# Patient Record
Sex: FEMALE | Race: ASIAN | NOT HISPANIC OR LATINO | Employment: OTHER | URBAN - METROPOLITAN AREA
[De-identification: names, ages, dates, MRNs, and addresses within clinical notes are randomized per-mention and may not be internally consistent; named-entity substitution may affect disease eponyms.]

---

## 2023-06-12 ENCOUNTER — VIRTUAL VISIT (OUTPATIENT)
Dept: FAMILY MEDICINE | Facility: CLINIC | Age: 78
End: 2023-06-12
Payer: COMMERCIAL

## 2023-06-12 DIAGNOSIS — Z91.199 NO-SHOW FOR APPOINTMENT: Primary | ICD-10-CM

## 2023-06-19 ENCOUNTER — TELEPHONE (OUTPATIENT)
Dept: FAMILY MEDICINE | Facility: CLINIC | Age: 78
End: 2023-06-19
Payer: COMMERCIAL

## 2023-06-19 NOTE — TELEPHONE ENCOUNTER
Left message to call     Patient needs to schedule appt with Dr Fonseca for med check. Telephone or in person is fine   Ok to use video appt slots for either type of appt per Dr Fonseca

## 2023-06-20 ENCOUNTER — APPOINTMENT (OUTPATIENT)
Dept: INTERPRETER SERVICES | Facility: CLINIC | Age: 78
End: 2023-06-20
Payer: COMMERCIAL

## 2023-06-23 ENCOUNTER — OFFICE VISIT (OUTPATIENT)
Dept: FAMILY MEDICINE | Facility: CLINIC | Age: 78
End: 2023-06-23
Payer: COMMERCIAL

## 2023-06-23 VITALS
BODY MASS INDEX: 21.57 KG/M2 | RESPIRATION RATE: 16 BRPM | HEIGHT: 57 IN | OXYGEN SATURATION: 95 % | WEIGHT: 100 LBS | TEMPERATURE: 97.9 F | DIASTOLIC BLOOD PRESSURE: 56 MMHG | HEART RATE: 55 BPM | SYSTOLIC BLOOD PRESSURE: 152 MMHG

## 2023-06-23 DIAGNOSIS — E78.2 MIXED HYPERLIPIDEMIA: ICD-10-CM

## 2023-06-23 DIAGNOSIS — R21 RASH: ICD-10-CM

## 2023-06-23 DIAGNOSIS — Z11.59 NEED FOR HEPATITIS C SCREENING TEST: ICD-10-CM

## 2023-06-23 DIAGNOSIS — L29.9 ITCHING: ICD-10-CM

## 2023-06-23 DIAGNOSIS — I10 HYPERTENSION, UNSPECIFIED TYPE: Primary | ICD-10-CM

## 2023-06-23 LAB
ALBUMIN SERPL BCG-MCNC: 4.1 G/DL (ref 3.5–5.2)
ALP SERPL-CCNC: 86 U/L (ref 35–104)
ALT SERPL W P-5'-P-CCNC: 29 U/L (ref 0–50)
ANION GAP SERPL CALCULATED.3IONS-SCNC: 9 MMOL/L (ref 7–15)
AST SERPL W P-5'-P-CCNC: 31 U/L (ref 0–45)
BASOPHILS # BLD AUTO: 0 10E3/UL (ref 0–0.2)
BASOPHILS NFR BLD AUTO: 1 %
BILIRUB DIRECT SERPL-MCNC: <0.2 MG/DL (ref 0–0.3)
BILIRUB SERPL-MCNC: 0.6 MG/DL
BUN SERPL-MCNC: 13.7 MG/DL (ref 8–23)
CALCIUM SERPL-MCNC: 9.4 MG/DL (ref 8.8–10.2)
CHLORIDE SERPL-SCNC: 104 MMOL/L (ref 98–107)
CHOLEST SERPL-MCNC: 235 MG/DL
CREAT SERPL-MCNC: 0.82 MG/DL (ref 0.51–0.95)
DEPRECATED HCO3 PLAS-SCNC: 29 MMOL/L (ref 22–29)
EOSINOPHIL # BLD AUTO: 0.1 10E3/UL (ref 0–0.7)
EOSINOPHIL NFR BLD AUTO: 4 %
ERYTHROCYTE [DISTWIDTH] IN BLOOD BY AUTOMATED COUNT: 12.2 % (ref 10–15)
GFR SERPL CREATININE-BSD FRML MDRD: 73 ML/MIN/1.73M2
GLUCOSE SERPL-MCNC: 85 MG/DL (ref 70–99)
HCT VFR BLD AUTO: 43.4 % (ref 35–47)
HCV AB SERPL QL IA: NONREACTIVE
HDLC SERPL-MCNC: 52 MG/DL
HGB BLD-MCNC: 14.7 G/DL (ref 11.7–15.7)
IMM GRANULOCYTES # BLD: 0 10E3/UL
IMM GRANULOCYTES NFR BLD: 0 %
LDLC SERPL CALC-MCNC: 136 MG/DL
LYMPHOCYTES # BLD AUTO: 1.4 10E3/UL (ref 0.8–5.3)
LYMPHOCYTES NFR BLD AUTO: 40 %
MCH RBC QN AUTO: 30.6 PG (ref 26.5–33)
MCHC RBC AUTO-ENTMCNC: 33.9 G/DL (ref 31.5–36.5)
MCV RBC AUTO: 90 FL (ref 78–100)
MONOCYTES # BLD AUTO: 0.5 10E3/UL (ref 0–1.3)
MONOCYTES NFR BLD AUTO: 14 %
NEUTROPHILS # BLD AUTO: 1.5 10E3/UL (ref 1.6–8.3)
NEUTROPHILS NFR BLD AUTO: 41 %
NONHDLC SERPL-MCNC: 183 MG/DL
PLATELET # BLD AUTO: 168 10E3/UL (ref 150–450)
POTASSIUM SERPL-SCNC: 4.1 MMOL/L (ref 3.4–5.3)
PROT SERPL-MCNC: 6.9 G/DL (ref 6.4–8.3)
RBC # BLD AUTO: 4.81 10E6/UL (ref 3.8–5.2)
SODIUM SERPL-SCNC: 142 MMOL/L (ref 136–145)
TRIGL SERPL-MCNC: 235 MG/DL
TSH SERPL DL<=0.005 MIU/L-ACNC: 2.61 UIU/ML (ref 0.3–4.2)
WBC # BLD AUTO: 3.6 10E3/UL (ref 4–11)

## 2023-06-23 PROCEDURE — 80061 LIPID PANEL: CPT | Performed by: FAMILY MEDICINE

## 2023-06-23 PROCEDURE — 99204 OFFICE O/P NEW MOD 45 MIN: CPT | Performed by: FAMILY MEDICINE

## 2023-06-23 PROCEDURE — 86803 HEPATITIS C AB TEST: CPT | Performed by: FAMILY MEDICINE

## 2023-06-23 PROCEDURE — 82248 BILIRUBIN DIRECT: CPT | Performed by: FAMILY MEDICINE

## 2023-06-23 PROCEDURE — 80050 GENERAL HEALTH PANEL: CPT | Performed by: FAMILY MEDICINE

## 2023-06-23 PROCEDURE — 36415 COLL VENOUS BLD VENIPUNCTURE: CPT | Performed by: FAMILY MEDICINE

## 2023-06-23 RX ORDER — CETIRIZINE HYDROCHLORIDE 10 MG/1
10 TABLET ORAL DAILY PRN
Qty: 90 TABLET | Refills: 1 | Status: SHIPPED | OUTPATIENT
Start: 2023-06-23 | End: 2023-07-24

## 2023-06-23 RX ORDER — TRIAMCINOLONE ACETONIDE 1 MG/G
CREAM TOPICAL 2 TIMES DAILY PRN
Qty: 45 G | Refills: 0 | Status: SHIPPED | OUTPATIENT
Start: 2023-06-23 | End: 2023-07-24

## 2023-06-23 NOTE — PATIENT INSTRUCTIONS
-Thank you for choosing the [facility].  -It was a pleasure to see you today.  -Please take a look at the information below for more specific details regarding the treatment plan and recommendations.  -In this after visit summary is a list of your medications and specific instructions.  Please review this carefully as there may be changes made to your medication list.  -If there are any particular questions or concerns, please feel free to reach out to Dr. Fonseca.  -If any labs have been completed, we will reach out to you about results.  If the results are normal or not concerning, a letter or MyChart message will be sent to you.  If any follow-up is needed, either Dr. Fonseca or the nurse will give you a call.  If you have not heard regarding results after 2 weeks, please reach out to the clinic.    Patient Instructions:    -Dr. Fonseca's nurse will call the pharmacy to obtain the most recent prescriptions for the blood pressure and cholesterol medications.  -Dr. Fonseca will subsequently sent to the medications once the information is known.    -The bruises are likely due to age and thinning of the skin.  -Dr. Fonseca has prescribed a topical and pill medication to use for the rash.  Try using the topical medication first.  If this is sufficient to control symptoms, continue to use the topical medication.  -The pill medication (cetirizine) can cause some drowsiness.  -It is important to moisturize the skin on a daily basis, especially during wintertime as the air is dry and can worsen the underlying skin dryness.  Common moisturizing over-the-counter options include: Aquaphor, Vaseline, Vanicream, night balms, various lotions.         -Topical steroid medications can be utilized to help control severe eczema or dry skin, though be cautious with usage of the medication as the topical steroids can cause the skin to become darker/lighter, thin the skin, melt/reduce the fat underneath, etc. Only apply the  topical steroid medications on areas that have severely dried and thickened skin.  Avoid use of the medication on normal skin or on the face/groin/armpits unless directed otherwise.  -When bathing, try to limit bathing to 2-3 times a week (or when visibly dirty) and to 10 minutes or less with each bathing session.  It is best to use warm water when bathing as water can worsen the dry skin.  -Right after bathing, it is recommended to apply the moisturizer.      Please seek immediate medical attention (go to the emergency room or urgent care) for the following reasons: worsening symptoms, or any concerning changes.    Please return to clinic in 1 month for a general physical and follow-up of blood pressure, or sooner as needed.      --------------------------------------------------------------------------------------------------------------------    -We are always looking for ways to improve.  You may be selected to receive a survey regarding your visit today.  We encourage you to complete the survey and provide specific, constructive feedback to help us improve our processes.  Thank you for your time!  -Please review the contact information listed on the after visit summary and in the electronic chart.  Below is the phone number that we have on file.  If there are any changes that are needed to be made, please reach out to the clinic.  [phone] (home)

## 2023-06-23 NOTE — PROGRESS NOTES
OFFICE VISIT    Assessment/Plan:     Patient Instructions:    -Dr. Fonseca's nurse will call the pharmacy to obtain the most recent prescriptions for the blood pressure and cholesterol medications.  -Dr. Fonseca will subsequently sent to the medications once the information is known.    -The bruises are likely due to age and thinning of the skin.  -Dr. Fonseca has prescribed a topical and pill medication to use for the rash.  Try using the topical medication first.  If this is sufficient to control symptoms, continue to use the topical medication.  -The pill medication (cetirizine) can cause some drowsiness.  -It is important to moisturize the skin on a daily basis, especially during wintertime as the air is dry and can worsen the underlying skin dryness.  Common moisturizing over-the-counter options include: Aquaphor, Vaseline, Vanicream, night balms, various lotions.         -Topical steroid medications can be utilized to help control severe eczema or dry skin, though be cautious with usage of the medication as the topical steroids can cause the skin to become darker/lighter, thin the skin, melt/reduce the fat underneath, etc. Only apply the topical steroid medications on areas that have severely dried and thickened skin.  Avoid use of the medication on normal skin or on the face/groin/armpits unless directed otherwise.  -When bathing, try to limit bathing to 2-3 times a week (or when visibly dirty) and to 10 minutes or less with each bathing session.  It is best to use warm water when bathing as water can worsen the dry skin.  -Right after bathing, it is recommended to apply the moisturizer.      Please seek immediate medical attention (go to the emergency room or urgent care) for the following reasons: worsening symptoms, or any concerning changes.    Please return to clinic in 1 month for a general physical and follow-up of blood pressure, or sooner as needed.      Torri was seen today for recheck medication.  Diagnoses  and all orders for this visit:    Hypertension, unspecified type  Mixed hyperlipidemia: In consideration of patient's current blood pressure being 152/56, there is concerns about the amlodipine-benazepril 5-10 mg once daily being too strong at this time.  Plan to await for lab results.  Likely will initiate on one of the two blood pressure medications and monitor going forward to see how the blood pressure responds.  Await lipid labs to determine dosing for atorvastatin.  -     Lipid panel reflex to direct LDL Non-fasting; Future  -     Basic metabolic panel; Future  -     CBC with Platelets & Differential; Future  -     Hepatic function panel; Future  -     TSH with free T4 reflex; Future    Rash  Itching: Likely related to exposure to allergen during yard work.  Plan to initiate treatment as below.  Plan to initiate triamcinolone cream first and if not improving, she may start the cetirizine.  -     cetirizine (ZYRTEC) 10 MG tablet; Take 1 tablet (10 mg) by mouth daily as needed for allergies (itching.)  -     triamcinolone (KENALOG) 0.1 % external cream; Apply topically 2 times daily as needed for irritation (rash, itching.)    Need for hepatitis C screening test  -     Hepatitis C Screen Reflex to HCV RNA Quant and Genotype; Future    Other orders  -     REVIEW OF HEALTH MAINTENANCE PROTOCOL ORDERS          The diagnoses, treatment options, risk, benefits, and recommendations were reviewed with patient/guardian.  Questions were answered to patient's/guardian satisfaction.  Red flag signs were reviewed.  Patient/guardian is in agreement with above plan.      Subjective: 78 year old female with history of hypertension, hyperlipidemia who presents to clinic for the following complaints:   Patient presents with:  Recheck Medication    Answers for HPI/ROS submitted by the patient on 6/23/2023  What is the reason for your visit today? : General check up  How many servings of fruits and vegetables do you eat daily?:  2-3  On average, how many sweetened beverages do you drink each day (Examples: soda, juice, sweet tea, etc.  Do NOT count diet or artificially sweetened beverages)?: 0  How many minutes a day do you exercise enough to make your heart beat faster?: 60 or more  How many days a week do you exercise enough to make your heart beat faster?: 4  How many days per week do you miss taking your medication?: 0        Patient is new to the healthcare system.  No previous records are available for review has not been an emergency room visit on 9/11/2016 for head injury and 12/25/2013 visit for labs.    Patient needs medications refilled. She was following with Dr. Cholo Diaz before and has been out of medications for 2-3 months now.    Patient reports that she has HTN and HLD. She takes medications regular for these.  She does not recall the medication names. She last took the medications about 2-3 months ago now. Denies issues or side effects on these medications.  She did not remember to bring the medication bottles today.    Patient denies any personal history of diabetes.  She otherwise is fairly healthy.    She has small itchy bumps on her arms after doing some yard work. She tried some medications from [place], likely an eczema cream that hasn't helped. This has been going on for 3-4 weeks now.  The lumps are persisting and continues to be itchy.    Reviewed vaccination history.  Patient has no records of her.  She has never gotten the influenza vaccine. For the COVID vaccine, she got two shots last year. The last dose was before Sept 2022, likely closer to 1 year now. She had some mild side effects with the shot that needed tylenol. No severe side effects noted. Discussed risks and benefits and current recommendations.  Patient declines today.  She was instructed to schedule a nurse only appointment to update vaccinations when she is ready. No [facility] records available for review.     Pharmacy: [facility].   "Received fax regarding patient's recent prescriptions.  Patient has been getting 3 medications filled: Amlodipine-benazepril 5-10 mg 1 pill by mouth daily for high blood pressure, atorvastatin 20 mg take 1 pill by mouth every day for high cholesterol, MAPAP arthritis 650 mg tablet take 1 pill by mouth every 6-8 hours as needed for pain.    No previous surgery.    FamHx:   -CAD, HTN, DM: none      Patient presents with granddaughter.     The 10 point review of system is negative except as stated in the HPI.    Allergies were reviewed and updated.    Objective:   BP (!) 152/56   Pulse 55   Temp 97.9  F (36.6  C) (Oral)   Resp 16   Ht 1.44 m (4' 8.69\")   Wt 45.4 kg (100 lb)   SpO2 95%   BMI 21.87 kg/m    General: Active, alert, nontoxic-appearing.  No acute distress.  HEENT: Normocephalic, atraumatic.  Pupils are equal and round.  Sclera is clear.  Normal external ears. Nares patent.  Moist mucous membranes.    Cardiac: Bradycardia, regular rhthym.  S1, S2 present.  No murmurs, rubs, or gallops.  Respiratory/chest: Clear to auscultation bilaterally.  No wheezes, rales, rhonchi.  Breathing is not labored.  No accessory muscle usage.  Extremities: Voluntary movements intact.  Integumentary: No concerning rash or skin changes appreciated.        Chris Fonseca MD  [facility] [place] [address]  Phone: [phone]  Fax: [phone]    6/23/2023  2:19 PM            Current Outpatient Medications   Medication     cetirizine (ZYRTEC) 10 MG tablet     triamcinolone (KENALOG) 0.1 % external cream     No current facility-administered medications for this visit.       No Known Allergies    There are no problems to display for this patient.      No family history on file.    No past surgical history on file.     Social History     Socioeconomic History     Marital status:      Spouse name: Not on file     Number of children: Not on file     Years of education: Not on file     Highest " education level: Not on file   Occupational History     Not on file   Tobacco Use     Smoking status: Never     Passive exposure: Never     Smokeless tobacco: Never   Vaping Use     Vaping Use: Never used   Substance and Sexual Activity     Alcohol use: Not on file     Drug use: Not on file     Sexual activity: Not on file   Other Topics Concern     Not on file   Social History Narrative     Not on file     Social Determinants of Health     Financial Resource Strain: Not on file   Food Insecurity: Not on file   Transportation Needs: Not on file   Physical Activity: Not on file   Stress: Not on file   Social Connections: Not on file   Intimate Partner Violence: Not on file   Housing Stability: Not on file

## 2023-06-26 ENCOUNTER — TELEPHONE (OUTPATIENT)
Dept: FAMILY MEDICINE | Facility: CLINIC | Age: 78
End: 2023-06-26
Payer: COMMERCIAL

## 2023-06-26 DIAGNOSIS — E78.2 MIXED HYPERLIPIDEMIA: ICD-10-CM

## 2023-06-26 DIAGNOSIS — I10 HYPERTENSION, UNSPECIFIED TYPE: Primary | ICD-10-CM

## 2023-06-26 RX ORDER — AMLODIPINE BESYLATE 5 MG/1
5 TABLET ORAL DAILY
Qty: 90 TABLET | Refills: 1 | Status: SHIPPED | OUTPATIENT
Start: 2023-06-26 | End: 2023-07-24

## 2023-06-26 RX ORDER — ATORVASTATIN CALCIUM 20 MG/1
20 TABLET, FILM COATED ORAL AT BEDTIME
Qty: 90 TABLET | Refills: 3 | Status: SHIPPED | OUTPATIENT
Start: 2023-06-26 | End: 2024-03-11

## 2023-06-26 NOTE — TELEPHONE ENCOUNTER
Team - please call patient with results.    Keshia Lowry,    I hope you have been well since our last visit. Below are the results from the testing completed at the visit.     The total cholesterol and LDL or bad cholesterol are high.  The triglycerides, which is primarily affected by food, is high. The HDL or good cholesterol are in the normal range.  Dr. Fonseca recommends that you restart the cholesterol lowering medication as you had been taking before.  A new prescription has been sent for you.  In addition, it is recommended for you to follow a diet that is rich in fruits and vegetables and low in fats and cholesterol.  In addition, find ways to stay active.  Doing aerobic activities (such as running, biking, etc.) will help improve the HDL or good cholesterol.      The white blood cell counts are slightly low, though can be normal variation.  No additional work-up is needed.  The rest of your lab work is normal.      Dr. Fonseca has sent a prescription for a blood pressure medication for you.  The blood pressure medication you got before had 2 different blood pressure medications combined.  Dr. Fonseca thinks you only need one of the medication at this time.  The medication was sent to the pharmacy for you.  Please start the blood pressure medication as prescribed.  Monitor for any issues or side effects.    Otherwise, Dr. Fonseca recommends that you continue on the plan as discussed in clinic.    If there are any questions or concerns, please call the clinic or schedule an appointment for follow up.     Best wishes,           Chris Fonseca MD  [facility]  6/26/2023  2:31 PM    Diagnoses and all orders for this visit:    Hypertension, unspecified type  -     amLODIPine (NORVASC) 5 MG tablet; Take 1 tablet (5 mg) by mouth daily    Mixed hyperlipidemia  -     atorvastatin (LIPITOR) 20 MG tablet; Take 1 tablet (20 mg) by mouth At Bedtime

## 2023-06-26 NOTE — TELEPHONE ENCOUNTER
Called patient with no answer and unable to leave a message due to no  set up. Called placed to the emergency contact, Ida Flores with no answer. Message left on vm request to return call back to clinic with patient for test result message.  Clinic number provided.    GABRIEL Ibarra, RN  [facility]      Team - please call patient with results.     Keshia Wild Essence,     I hope you have been well since our last visit. Below are the results from the testing completed at the visit.      The total cholesterol and LDL or bad cholesterol are high.  The triglycerides, which is primarily affected by food, is high. The HDL or good cholesterol are in the normal range.  Dr. Fonseca recommends that you restart the cholesterol lowering medication as you had been taking before.  A new prescription has been sent for you.  In addition, it is recommended for you to follow a diet that is rich in fruits and vegetables and low in fats and cholesterol.  In addition, find ways to stay active.  Doing aerobic activities (such as running, biking, etc.) will help improve the HDL or good cholesterol.       The white blood cell counts are slightly low, though can be normal variation.  No additional work-up is needed.  The rest of your lab work is normal.       Dr. Fonseca has sent a prescription for a blood pressure medication for you.  The blood pressure medication you got before had 2 different blood pressure medications combined.  Dr. Fonseca thinks you only need one of the medication at this time.  The medication was sent to the pharmacy for you.  Please start the blood pressure medication as prescribed.  Monitor for any issues or side effects.     Otherwise, Dr. Fonseca recommends that you continue on the plan as discussed in clinic.     If there are any questions or concerns, please call the clinic or schedule an appointment for follow up.      Best wishes,          Chris Fonseca MD  [facility]  6/26/2023   2:31 PM

## 2023-06-27 ENCOUNTER — APPOINTMENT (OUTPATIENT)
Dept: INTERPRETER SERVICES | Facility: CLINIC | Age: 78
End: 2023-06-27
Payer: COMMERCIAL

## 2023-06-27 NOTE — TELEPHONE ENCOUNTER
Called patient but not home. Reached daughter in law, Leighann Saldivar with verbal message left request patient return call for test result. Due to no BETY on file, unable to speak with family.  Clinic number provided.    GABRIEL Ibarra, RN  [facility]        Keshia Lowry,     I hope you have been well since our last visit. Below are the results from the testing completed at the visit.      The total cholesterol and LDL or bad cholesterol are high.  The triglycerides, which is primarily affected by food, is high. The HDL or good cholesterol are in the normal range.  Dr. Fonseca recommends that you restart the cholesterol lowering medication as you had been taking before.  A new prescription has been sent for you.  In addition, it is recommended for you to follow a diet that is rich in fruits and vegetables and low in fats and cholesterol.  In addition, find ways to stay active.  Doing aerobic activities (such as running, biking, etc.) will help improve the HDL or good cholesterol.       The white blood cell counts are slightly low, though can be normal variation.  No additional work-up is needed.  The rest of your lab work is normal.       Dr. Fonseca has sent a prescription for a blood pressure medication for you.  The blood pressure medication you got before had 2 different blood pressure medications combined.  Dr. Fonseca thinks you only need one of the medication at this time.  The medication was sent to the pharmacy for you.  Please start the blood pressure medication as prescribed.  Monitor for any issues or side effects.     Otherwise, Dr. Fonseca recommends that you continue on the plan as discussed in clinic.     If there are any questions or concerns, please call the clinic or schedule an appointment for follow up.      Best wishes,          Chris Fonseca MD  [facility]

## 2023-06-27 NOTE — TELEPHONE ENCOUNTER
Patient returns call. Writer relay RN/provider's message below with help of waleska  Willy. Patient will  medications from pharmacy. Caller verbalizes understanding and has no further questions.     Nichelle Diaz,   [facility]  June 27, 2023 1:44 PM

## 2023-07-24 ENCOUNTER — OFFICE VISIT (OUTPATIENT)
Dept: FAMILY MEDICINE | Facility: CLINIC | Age: 78
End: 2023-07-24
Payer: COMMERCIAL

## 2023-07-24 VITALS
TEMPERATURE: 97.6 F | BODY MASS INDEX: 20.93 KG/M2 | HEART RATE: 62 BPM | DIASTOLIC BLOOD PRESSURE: 65 MMHG | RESPIRATION RATE: 16 BRPM | OXYGEN SATURATION: 96 % | WEIGHT: 97 LBS | SYSTOLIC BLOOD PRESSURE: 126 MMHG | HEIGHT: 57 IN

## 2023-07-24 DIAGNOSIS — I10 HYPERTENSION, UNSPECIFIED TYPE: ICD-10-CM

## 2023-07-24 DIAGNOSIS — L29.9 ITCHING: ICD-10-CM

## 2023-07-24 DIAGNOSIS — E78.2 MIXED HYPERLIPIDEMIA: ICD-10-CM

## 2023-07-24 DIAGNOSIS — Z00.00 MEDICARE ANNUAL WELLNESS VISIT, SUBSEQUENT: Primary | ICD-10-CM

## 2023-07-24 DIAGNOSIS — R21 RASH: ICD-10-CM

## 2023-07-24 PROCEDURE — 99214 OFFICE O/P EST MOD 30 MIN: CPT | Mod: 25 | Performed by: FAMILY MEDICINE

## 2023-07-24 PROCEDURE — G0439 PPPS, SUBSEQ VISIT: HCPCS | Performed by: FAMILY MEDICINE

## 2023-07-24 RX ORDER — CETIRIZINE HYDROCHLORIDE 10 MG/1
10 TABLET ORAL DAILY PRN
Qty: 90 TABLET | Refills: 3 | Status: SHIPPED | OUTPATIENT
Start: 2023-07-24

## 2023-07-24 RX ORDER — TRIAMCINOLONE ACETONIDE 1 MG/G
CREAM TOPICAL 2 TIMES DAILY PRN
Qty: 45 G | Refills: 3 | Status: SHIPPED | OUTPATIENT
Start: 2023-07-24

## 2023-07-24 RX ORDER — AMLODIPINE BESYLATE 5 MG/1
5 TABLET ORAL DAILY
Qty: 90 TABLET | Refills: 3 | Status: SHIPPED | OUTPATIENT
Start: 2023-07-24 | End: 2024-03-11

## 2023-07-24 ASSESSMENT — ENCOUNTER SYMPTOMS
SHORTNESS OF BREATH: 0
NAUSEA: 0
JOINT SWELLING: 0
DIARRHEA: 0
PARESTHESIAS: 0
WEAKNESS: 0
HEARTBURN: 0
EYE PAIN: 0
FEVER: 0
DIZZINESS: 0
CHILLS: 0
HEMATURIA: 0
HEMATOCHEZIA: 0
NERVOUS/ANXIOUS: 0
ABDOMINAL PAIN: 0
FREQUENCY: 0
MYALGIAS: 0
DYSURIA: 0
CONSTIPATION: 0
PALPITATIONS: 0
ARTHRALGIAS: 0
COUGH: 0
BREAST MASS: 0
HEADACHES: 0
SORE THROAT: 0

## 2023-07-24 ASSESSMENT — ACTIVITIES OF DAILY LIVING (ADL)
CURRENT_FUNCTION: SHOPPING REQUIRES ASSISTANCE
CURRENT_FUNCTION: TRANSPORTATION REQUIRES ASSISTANCE
CURRENT_FUNCTION: MEDICATION ADMINISTRATION REQUIRES ASSISTANCE
CURRENT_FUNCTION: MONEY MANAGEMENT REQUIRES ASSISTANCE
CURRENT_FUNCTION: PREPARING MEALS REQUIRES ASSISTANCE
CURRENT_FUNCTION: LAUNDRY REQUIRES ASSISTANCE

## 2023-07-24 NOTE — PROGRESS NOTES
"SUBJECTIVE:   Torri is a 78 year old who presents for Preventive Visit.      7/24/2023    12:04 PM   Additional Questions   Roomed by Rula Aggarwal RN   Accompanied by none         7/24/2023    12:04 PM   Patient Reported Additional Medications   Patient reports taking the following new medications no     Are you in the first 12 months of your Medicare coverage?  No    Healthy Habits:     In general, how would you rate your overall health?  Excellent    Frequency of exercise:  2-3 days/week    Duration of exercise:  15-30 minutes    Do you usually eat at least 4 servings of fruit and vegetables a day, include whole grains    & fiber and avoid regularly eating high fat or \"junk\" foods?  Yes    Taking medications regularly:  No    Barriers to taking medications:  None    Medication side effects:  None    Ability to successfully perform activities of daily living:  Transportation requires assistance, shopping requires assistance, preparing meals requires assistance, laundry requires assistance, medication administration requires assistance and money management requires assistance    Home Safety:  No safety concerns identified    Hearing Impairment:  No hearing concerns    In the past 6 months, have you been bothered by leaking of urine?  No    In general, how would you rate your overall mental or emotional health?  Excellent    Additional concerns today:  No         Have you ever done Advance Care Planning? (For example, a Health Directive, POLST, or a discussion with a medical provider or your loved ones about your wishes): No, advance care planning information given to patient to review.  Patient declined advance care planning discussion at this time.  Reviewed with patient. She will take the form home and think about discussing with her children/family.      Fall risk  Fallen 2 or more times in the past year?: No  Any fall with injury in the past year?: No    Cognitive Screening   1) Repeat 3 items: Car, Chair, Bird "   2) Clock draw: ABNORMAL .    3) 3 item recall: Recalls 3 objects  Results: ABNORMAL clock, 1-2 items recalled: PROBABLE COGNITIVE IMPAIRMENT, **INFORM PROVIDER**    Mini-CogTM Copyright NIHARIKA Jack. Licensed by the author for use in [facility]; reprinted with permission (daxa@[facility]). All rights reserved.      Reviewed with patient the above findings and usual recommendations. Patient has never become lost. Sometimes, she forgets where she puts things, though this is just sometimes.  Family takes care of some ADLs for her, such as meal prep, laundry, etc. She is still able to go to the bathroom and shower without issues. Patient elects to monitor at this time.     -Reviewed healthy eating and exercise.  -Skin cancer screening: No concerning skin changes expressed by patient.  -Smoking status:   Tobacco Use      Smoking status: Never      Smokeless tobacco: Never    -Family history:   Family History   Problem Relation Age of Onset     Diabetes No family hx of      Coronary Artery Disease No family hx of      Hyperlipidemia No family hx of        Preventative health recommendations, evaluation options, and risk/benefits of each were discussed with patient. Accepted recommendations were ordered. Otherwise, patient declined.  Health Maintenance Due   Topic Date Due     DEXA  Never done     COVID-19 Vaccine (1) Never done     ZOSTER IMMUNIZATION (1 of 2) Never done     MEDICARE ANNUAL WELLNESS VISIT  Never done     DEXA: she thinks she has completed this a while ago, though this has been years now.   She reports that she has no aches or pains. Discussed osteoporosis.     She got the Tetanus booster 1-2 years ago now.   She also completed the pneumonia vaccine 1-2 years.     -Immunizations due were reviewed.    Immunization History   Administered Date(s) Administered     Pneumococcal 20 valent Conjugate (Prevnar 20) 01/01/2022     TDAP (Adacel,Boostrix) 01/01/2022       -Labs: Laboratory recommendations  reviewed with patient.  Recent labs completed on 6/23/2023.  Results as below.    -Breast cancer screening: biennial screening mammography for women aged 50 to 74 years. Last mammogram: No longer indicated.    -Cervical cancer screening: No longer indicated.    -Colon cancer screening: No longer indicated.     Latest Reference Range & Units 06/23/23 09:56   Sodium 136 - 145 mmol/L 142   Potassium 3.4 - 5.3 mmol/L 4.1   Chloride 98 - 107 mmol/L 104   Carbon Dioxide (CO2) 22 - 29 mmol/L 29   Urea Nitrogen 8.0 - 23.0 mg/dL 13.7   Creatinine 0.51 - 0.95 mg/dL 0.82   GFR Estimate >60 mL/min/1.73m2 73   Calcium 8.8 - 10.2 mg/dL 9.4   Anion Gap 7 - 15 mmol/L 9   Albumin 3.5 - 5.2 g/dL 4.1   Protein Total 6.4 - 8.3 g/dL 6.9   Alkaline Phosphatase 35 - 104 U/L 86   ALT 0 - 50 U/L 29   AST 0 - 45 U/L 31   Bilirubin Direct 0.00 - 0.30 mg/dL <0.20   Bilirubin Total <=1.2 mg/dL 0.6   Cholesterol <200 mg/dL 235 (H)   Glucose 70 - 99 mg/dL 85   HDL Cholesterol >=50 mg/dL 52   LDL Cholesterol Calculated <=100 mg/dL 136 (H)   Non HDL Cholesterol <130 mg/dL 183 (H)   Triglycerides <150 mg/dL 235 (H)   TSH 0.30 - 4.20 uIU/mL 2.61   WBC 4.0 - 11.0 10e3/uL 3.6 (L)   Hemoglobin 11.7 - 15.7 g/dL 14.7   Hematocrit 35.0 - 47.0 % 43.4   Platelet Count 150 - 450 10e3/uL 168   RBC Count 3.80 - 5.20 10e6/uL 4.81   MCV 78 - 100 fL 90   MCH 26.5 - 33.0 pg 30.6   MCHC 31.5 - 36.5 g/dL 33.9   RDW 10.0 - 15.0 % 12.2   % Neutrophils % 41   % Lymphocytes % 40   % Monocytes % 14   % Eosinophils % 4   % Basophils % 1   Absolute Basophils 0.0 - 0.2 10e3/uL 0.0   Absolute Eosinophils 0.0 - 0.7 10e3/uL 0.1   Absolute Immature Granulocytes <=0.4 10e3/uL 0.0   Absolute Lymphocytes 0.8 - 5.3 10e3/uL 1.4   Absolute Monocytes 0.0 - 1.3 10e3/uL 0.5   % Immature Granulocytes % 0   Absolute Neutrophils 1.6 - 8.3 10e3/uL 1.5 (L)   Hepatitis C Antibody Nonreactive  Nonreactive   (H): Data is abnormally high  (L): Data is abnormally low    Reviewed and updated as  needed this visit by clinical staff   Tobacco  Allergies  Meds              Reviewed and updated as needed this visit by Provider                 Social History     Tobacco Use     Smoking status: Never     Passive exposure: Never     Smokeless tobacco: Never   Substance Use Topics     Alcohol use: Not on file             7/24/2023    11:56 AM   Alcohol Use   Prescreen: >3 drinks/day or >7 drinks/week? Not Applicable     Do you have a current opioid prescription? No  Do you use any other controlled substances or medications that are not prescribed by a provider? None      Current providers sharing in care for this patient include:   Patient Care Team:  No Ref-Primary, Physician as PCP - General    The following health maintenance items are reviewed in Epic and correct as of today:  Health Maintenance   Topic Date Due     DEXA  Never done     COVID-19 Vaccine (1) Never done     ZOSTER IMMUNIZATION (1 of 2) Never done     MEDICARE ANNUAL WELLNESS VISIT  Never done     INFLUENZA VACCINE (1) 09/01/2023     ANNUAL REVIEW OF HM ORDERS  06/23/2024     FALL RISK ASSESSMENT  07/24/2024     LIPID  06/23/2028     ADVANCE CARE PLANNING  07/24/2028     DTAP/TDAP/TD IMMUNIZATION (2 - Td or Tdap) 01/01/2032     HEPATITIS C SCREENING  Completed     PHQ-2 (once per calendar year)  Completed     Pneumococcal Vaccine: 65+ Years  Completed     IPV IMMUNIZATION  Aged Out     MENINGITIS IMMUNIZATION  Aged Out     Labs reviewed in EPIC      Review of Systems   Constitutional:  Negative for chills and fever.   HENT:  Negative for congestion, ear pain, hearing loss and sore throat.    Eyes:  Negative for pain and visual disturbance.   Respiratory:  Negative for cough and shortness of breath.    Cardiovascular:  Negative for chest pain, palpitations and peripheral edema.   Gastrointestinal:  Negative for abdominal pain, constipation, diarrhea, heartburn, hematochezia and nausea.   Breasts:  Negative for tenderness, breast mass and  "discharge.   Genitourinary:  Negative for dysuria, frequency, genital sores, hematuria, pelvic pain, urgency, vaginal bleeding and vaginal discharge.   Musculoskeletal:  Negative for arthralgias, joint swelling and myalgias.   Skin:  Negative for rash.   Neurological:  Negative for dizziness, weakness, headaches and paresthesias.   Psychiatric/Behavioral:  Negative for mood changes. The patient is not nervous/anxious.        OBJECTIVE:   /65 (BP Location: Left arm, Patient Position: Sitting, Cuff Size: Adult Regular)   Pulse 62   Temp 97.6  F (36.4  C) (Oral)   Resp 16   Ht 1.442 m (4' 8.77\")   Wt 44 kg (97 lb)   SpO2 96%   BMI 21.16 kg/m   Estimated body mass index is 21.16 kg/m  as calculated from the following:    Height as of this encounter: 1.442 m (4' 8.77\").    Weight as of this encounter: 44 kg (97 lb).  Physical Exam  GENERAL APPEARANCE: healthy, alert and no distress  EYES: Eyes grossly normal to inspection, PERRL and conjunctivae and sclerae normal  HENT: ear canals and TM's normal, nose and mouth without ulcers or lesions, oropharynx clear and oral mucous membranes moist  NECK: no adenopathy, no asymmetry, masses, or scars and thyroid normal to palpation  RESP: lungs clear to auscultation - no rales, rhonchi or wheezes  BREAST: Not indicated. Concerns expressed by patient.   CV: regular rate and rhythm, normal S1 S2, no S3 or S4, no murmur, click or rub, no peripheral edema and peripheral pulses strong  ABDOMEN: soft, nontender, no hepatosplenomegaly, no masses and bowel sounds normal  MS: no musculoskeletal defects are noted and gait is age appropriate without ataxia  SKIN: no suspicious lesions or rashes  NEURO: Normal strength and tone, sensory exam grossly normal, mentation intact and speech normal  PSYCH: mentation appears normal and affect normal/bright    Diagnostic Test Results:  Labs reviewed in Epic    ASSESSMENT / PLAN:     Patient Instructions:    -You are doing great.  -Continue " to eat well.  Try to increase your servings of calcium as this can help your bones stay strong and healthy.  Follow a nutrition plan patient fruits and vegetables and low in fats and cholesterol.    -Be sure to eat 5-7 servings of fruits and vegetables each day.  -Find ways to stay active.  Try to get 150 minutes of moderate activity (where you are breathing faster and slightly sweating) each week.  -Try to maintain a body mass index (BMI) of 18.5-25 as this is considered a healthier weight range.  -Brush your teeth twice daily.  See a dentist every 6-12 months.  -Be sure to use sunblock with SPF 15 or greater when going outside for extended periods of time.  Sunblock should be used even when it is a cloudy day.  Do intermittent skin checks for any concerning skin changes.  Wearing a wide brimmed hat and sunglasses can also be helpful to protect your skin from the sun.  -Monitor for any abnormal skin changes (such as new moles/spots, painful moles, changes in your old moles, wounds that will not heal, multiple colors noted in one lesion, lesions that are asymmetric or not circular, or anything that is concerning for you). If any of these are noted, please schedule an appointment to be seen.     -It is generally recommended for you to complete a health care directive or living will. These documents will be able to reflect your wishes and desire in the case that you are unable to express them yourself. Please let Dr. Fonseca know if you would like some assistance with this process.    HM due was reviewed with patient/parent.  Recommendations, risk, benefits were reviewed.  Accepted recommendations were ordered.  Otherwise, patient/parent declined.    Health Maintenance Due   Topic Date Due     DEXA  Never done     ADVANCE CARE PLANNING  Never done     COVID-19 Vaccine (1) Never done     DTAP/TDAP/TD IMMUNIZATION (1 - Tdap) Never done     ZOSTER IMMUNIZATION (1 of 2) Never done     MEDICARE ANNUAL WELLNESS VISIT  Never  done     Pneumococcal Vaccine: 65+ Years (1 - PCV) Never done     -The Shingles/Shingrix vaccine is generally better covered by insurance companies if the vaccine is received at a pharmacy.  Please speak with your pharmacist regarding this vaccination as it is recommended for you.    Please seek immediate medical attention (go to the emergency room or urgent care) for the following reasons: worsening symptoms, or any concerning changes.    Please return to clinic in 1 year for a Medicare wellness visit, or sooner as needed.  It is recommended for you to complete the influenza vaccine each year around September/October.    Torri was seen today for annual visit.  Diagnoses and all orders for this visit:    Medicare annual wellness visit, subsequent    Hypertension, unspecified type: stable. Refill as below.  Continue amlodipine.  -     amLODIPine (NORVASC) 5 MG tablet; Take 1 tablet (5 mg) by mouth daily    Mixed hyperlipidemia: stable.  Continue atorvastatin.    Rash  Itching: Off-and-on.  Medications below.  Refill given.  Continue medications as prescribed.  -     cetirizine (ZYRTEC) 10 MG tablet; Take 1 tablet (10 mg) by mouth daily as needed for allergies (itching.)  -     triamcinolone (KENALOG) 0.1 % external cream; Apply topically 2 times daily as needed for irritation (rash, itching.)        Patient has been advised of split billing requirements and indicates understanding: Yes (by nurse)      COUNSELING:  Reviewed preventive health recommendations.        She reports that she has never smoked. She has never been exposed to tobacco smoke. She has never used smokeless tobacco.      Appropriate preventive services were discussed with this patient, including applicable screening as appropriate for cardiovascular disease, diabetes, osteopenia/osteoporosis, and glaucoma.  As appropriate for age/gender, discussed screening for colorectal cancer, prostate cancer, breast cancer, and cervical cancer. Checklist  reviewing preventive services available has been given to the patient.    Chris Fonseca MD  [facility] [place] [address]  Phone: [phone]  Fax: [phone]    7/24/2023  2:06 PM

## 2023-07-24 NOTE — PATIENT INSTRUCTIONS
-Thank you for choosing the [facility].  -It was a pleasure to see you today.  -Please take a look at the information below for more specific details regarding the treatment plan and recommendations.  -In this after visit summary is a list of your medications and specific instructions.  Please review this carefully as there may be changes made to your medication list.  -If there are any particular questions or concerns, please feel free to reach out to Dr. Fonseca.  -If any labs have been completed, we will reach out to you about results.  If the results are normal or not concerning, a letter or MyChart message will be sent to you.  If any follow-up is needed, either Dr. Fonseca or the nurse will give you a call.  If you have not heard regarding results after 2 weeks, please reach out to the clinic.    Patient Instructions:    -You are doing great.  -Continue to eat well.  Try to increase your servings of calcium as this can help your bones stay strong and healthy.  Follow a nutrition plan patient fruits and vegetables and low in fats and cholesterol.    -Be sure to eat 5-7 servings of fruits and vegetables each day.  -Find ways to stay active.  Try to get 150 minutes of moderate activity (where you are breathing faster and slightly sweating) each week.  -Try to maintain a body mass index (BMI) of 18.5-25 as this is considered a healthier weight range.  -Brush your teeth twice daily.  See a dentist every 6-12 months.  -Be sure to use sunblock with SPF 15 or greater when going outside for extended periods of time.  Sunblock should be used even when it is a cloudy day.  Do intermittent skin checks for any concerning skin changes.  Wearing a wide brimmed hat and sunglasses can also be helpful to protect your skin from the sun.  -Monitor for any abnormal skin changes (such as new moles/spots, painful moles, changes in your old moles, wounds that will not heal, multiple colors noted in one lesion,  lesions that are asymmetric or not circular, or anything that is concerning for you). If any of these are noted, please schedule an appointment to be seen.     -It is generally recommended for you to complete a health care directive or living will. These documents will be able to reflect your wishes and desire in the case that you are unable to express them yourself. Please let Dr. Fonseca know if you would like some assistance with this process.    HM due was reviewed with patient/parent.  Recommendations, risk, benefits were reviewed.  Accepted recommendations were ordered.  Otherwise, patient/parent declined.    Health Maintenance Due   Topic Date Due    DEXA  Never done    ADVANCE CARE PLANNING  Never done    COVID-19 Vaccine (1) Never done    DTAP/TDAP/TD IMMUNIZATION (1 - Tdap) Never done    ZOSTER IMMUNIZATION (1 of 2) Never done    MEDICARE ANNUAL WELLNESS VISIT  Never done    Pneumococcal Vaccine: 65+ Years (1 - PCV) Never done     -The Shingles/Shingrix vaccine is generally better covered by insurance companies if the vaccine is received at a pharmacy.  Please speak with your pharmacist regarding this vaccination as it is recommended for you.    Please seek immediate medical attention (go to the emergency room or urgent care) for the following reasons: worsening symptoms, or any concerning changes.    Please return to clinic in 1 year for a Medicare wellness visit, or sooner as needed.  It is recommended for you to complete the influenza vaccine each year around September/October.      --------------------------------------------------------------------------------------------------------------------    -We are always looking for ways to improve.  You may be selected to receive a survey regarding your visit today.  We encourage you to complete the survey and provide specific, constructive feedback to help us improve our processes.  Thank you for your time!  -Please review the contact information listed on the  after visit summary and in the electronic chart.  Below is the phone number that we have on file.  If there are any changes that are needed to be made, please reach out to the clinic.  [phone] (home)

## 2023-08-22 ENCOUNTER — TRANSFERRED RECORDS (OUTPATIENT)
Dept: HEALTH INFORMATION MANAGEMENT | Facility: CLINIC | Age: 78
End: 2023-08-22
Payer: COMMERCIAL

## 2023-09-12 ENCOUNTER — PATIENT OUTREACH (OUTPATIENT)
Dept: GERIATRIC MEDICINE | Facility: CLINIC | Age: 78
End: 2023-09-12
Payer: COMMERCIAL

## 2023-09-12 NOTE — PROGRESS NOTES
[facility] Care Coordination Contact    Member became effective with  [facility] on 09/01/2023 with [facility].  Previous Health Plan: [facility]  Previous Care System: [facility]  Previous care coordinators name and number: Doris Tinsley Type: N/A  Last MMIS Entry: Date 08/2023 and Type Telephone Assessment  MMIS visit date (and type) if different from above: N/A  Services Listed in MMIS:   UTF received: No: Requested on email [facility] for UTF at [email or web address]  Address/Phone discrepancy: Please confirm If member has establish care with PCP    Ana Paula Dillon  Care Management Specialist  [facility]  [phone]

## 2023-09-12 NOTE — LETTER
September 12, 2023    PRESLEY DORMAN  [address] [place] [address]      Dear Presley:    As a member of [facility] ([facility] ([facility]), you are provided a care coordinator. I will be your new care coordinator as of 09/01/2023. I will be calling you soon to see how you are doing and determine your needs.    If you have any questions, please feel free to call me at [phone]. If you reach my voice mail, please leave a message and your phone number. If you are hearing impaired, please call the [place] Relay at [phone] or [phone] ([email or web address] relay service).    I look forward to speaking with you soon.    Sincerely,    Sidney Fonseca RN, N  [phone]  [email or web address] is a health plan that contracts with both Medicare and the [place] Medical Assistance (Medicaid) program to provide benefits of both programs to enrollees. Enrollment in [facility] depends on contract renewal.      [facility]  [id number] DHS Approved ([id number])  [id number] (11/18)

## 2023-09-13 ENCOUNTER — PATIENT OUTREACH (OUTPATIENT)
Dept: GERIATRIC MEDICINE | Facility: CLINIC | Age: 78
End: 2023-09-13
Payer: COMMERCIAL

## 2023-09-13 NOTE — PROGRESS NOTES
[facility] Care Coordination Contact    Attempted to reach member, no answer and voice mail not set up yet.    Called 2nd phone number on file which is a local phone number and spoke with daughter-in-law. Per daughter-in-law, member doesn't answer calls that she is not familiar with phone number. She will call member to inform that CC will be calling her. Daughter-in-law will call CC back after she gets hold of her member and CC will call member again.    Sidney Fonseca RN, PHN   [facility]  [phone]

## 2023-10-03 NOTE — PROGRESS NOTES
[facility] Care Coordination Contact    [facility] Change (Transfer)    Member is new enrollee to [facility] effective 9/1/2023 with [facility]. Member transferred from [facility].    No home visit required because this care coordinator (CC) has received all required documentation from the previous CC.    Writer t/c to member, introduced self as member's new CC. Confirmed with member that the welcome letter with writer's name and contact information has been received.  Reviewed [facility]/Health Risk Assessment (HRA) and POC with member. No changes noted.  Transitional HRA completed. Care Plan Summary updated and reflects current services.  Required referral authorization information communicated to CMS: Not applicable    Writer reviewed the following with member:    ER visits: No  Hospitalizations: No  TCU stays: No  Significant health status changes: No  Falls/Injuries: No  ADL/IADL changes: No  Changes in services: No formal services    Member reports she is doing well. She reports currently out of the [place] for a vacation with grand-daughters.    Follow-Up Plan: Member informed of future contact, plan to f/u with member with at next regularly scheduled contact.  Contact information shared with member and encouraged member to call with any questions or concerns.    Sidney Fonseca RN, PHN   [facility]  [phone]

## 2024-01-31 ENCOUNTER — OFFICE VISIT (OUTPATIENT)
Dept: FAMILY MEDICINE | Facility: CLINIC | Age: 79
End: 2024-01-31
Payer: COMMERCIAL

## 2024-01-31 VITALS
TEMPERATURE: 97.8 F | RESPIRATION RATE: 16 BRPM | HEIGHT: 58 IN | SYSTOLIC BLOOD PRESSURE: 130 MMHG | HEART RATE: 60 BPM | OXYGEN SATURATION: 99 % | DIASTOLIC BLOOD PRESSURE: 58 MMHG | BODY MASS INDEX: 20.94 KG/M2 | WEIGHT: 99.75 LBS

## 2024-01-31 DIAGNOSIS — S82.032P CLOSED DISPLACED TRANSVERSE FRACTURE OF LEFT PATELLA WITH MALUNION, SUBSEQUENT ENCOUNTER: Primary | ICD-10-CM

## 2024-01-31 PROCEDURE — 99214 OFFICE O/P EST MOD 30 MIN: CPT | Performed by: FAMILY MEDICINE

## 2024-01-31 RX ORDER — HYDROCODONE BITARTRATE AND ACETAMINOPHEN 5; 325 MG/1; MG/1
1 TABLET ORAL EVERY 6 HOURS PRN
Qty: 21 TABLET | Refills: 0 | Status: SHIPPED | OUTPATIENT
Start: 2024-01-31 | End: 2024-02-07

## 2024-01-31 RX ORDER — RESPIRATORY SYNCYTIAL VIRUS VACCINE 120MCG/0.5
0.5 KIT INTRAMUSCULAR ONCE
Qty: 1 EACH | Refills: 0 | Status: CANCELLED | OUTPATIENT
Start: 2024-01-31 | End: 2024-01-31

## 2024-01-31 RX ORDER — HYDROCODONE BITARTRATE AND ACETAMINOPHEN 5; 325 MG/1; MG/1
1 TABLET ORAL
COMMUNITY
Start: 2024-01-24 | End: 2024-01-31

## 2024-01-31 RX ORDER — DOCUSATE SODIUM 100 MG/1
100 CAPSULE, LIQUID FILLED ORAL
COMMUNITY
Start: 2024-01-24 | End: 2024-02-03

## 2024-01-31 NOTE — PATIENT INSTRUCTIONS
-Thank you for choosing the [facility].  -It was a pleasure to see you today.  -Please take a look at the information below for more specific details regarding the treatment plan and recommendations.  -In this after visit summary is a list of your medications and specific instructions.  Please review this carefully as there may be changes made to your medication list.  -If there are any particular questions or concerns, please feel free to reach out to Dr. Fonseca.  -If any labs have been completed, we will reach out to you about results.  If the results are normal or not concerning, a letter or Funderbeamhart message will be sent to you.  If any follow-up is needed, either Dr. Fonseca or the nurse will give you a call.  If you have not heard regarding results after 2 weeks, please reach out to the clinic.    Patient Instructions:    -Take the medications as prescribed.   -Do NOT take ibuprofen/naproxen.   -Do NOT bend your leg for any reason as this could pull apart the knee cap bone even further.    -Rest and limit usage of the affected area.  -Try to remain active and limit your activity based on discomfort.  -Apply a cold pack to the affected area for a maximum of 20 minutes at a time, once an hour as needed for pain and swelling.  Application of the cold pack for more than 20 minutes can increase risk of injuries to surrounding tissue.  -Apply a warm pack to the affected area as needed for discomforts.  The warm pack will help to improve blood flow and relax surrounding tissues.  You may make your own warm pack by doing the following: Take a sock, fill the sock with uncooked rice, and tie it off at the opened end.  You may place the sock and rice in the microwave for 30-60 seconds at a time or until warm.  Be cautious that the sock/rice is not too hot.    Please seek immediate medical attention (go to the emergency room or urgent care) for the following reasons: worsening symptoms, or any concerning  changes.      --------------------------------------------------------------------------------------------------------------------    -We are always looking for ways to improve.  You may be selected to receive a survey regarding your visit today.  We encourage you to complete the survey and provide specific, constructive feedback to help us improve our processes.  Thank you for your time!  -Please review the contact information listed on the after visit summary and in the electronic chart.  Below is the phone number that we have on file.  If there are any changes that are needed to be made, please reach out to the clinic.  [phone] (home)

## 2024-01-31 NOTE — PROGRESS NOTES
OFFICE VISIT    Assessment/Plan:     Patient Instructions:    -Take the medications as prescribed.   -Do NOT take ibuprofen/naproxen.   -Do NOT bend your leg for any reason as this could pull apart the knee cap bone even further.    -Rest and limit usage of the affected area.  -Try to remain active and limit your activity based on discomfort.  -Apply a cold pack to the affected area for a maximum of 20 minutes at a time, once an hour as needed for pain and swelling.  Application of the cold pack for more than 20 minutes can increase risk of injuries to surrounding tissue.  -Apply a warm pack to the affected area as needed for discomforts.  The warm pack will help to improve blood flow and relax surrounding tissues.  You may make your own warm pack by doing the following: Take a sock, fill the sock with uncooked rice, and tie it off at the opened end.  You may place the sock and rice in the microwave for 30-60 seconds at a time or until warm.  Be cautious that the sock/rice is not too hot.    Please seek immediate medical attention (go to the emergency room or urgent care) for the following reasons: worsening symptoms, or any concerning changes.        Torri was seen today for hospital f/u.  Diagnoses and all orders for this visit:    Closed displaced transverse fracture of left patella with malunion, subsequent encounter: On examination, patient does appear to have a separation of the patella that is about 1.5 cm now.  Reviewed the importance of patient leaving the leg and the leg brace as well as keeping the leg straight.  Discussed anticipated course of treatment and recovery.  Highly recommended patient be seen by the orthopedic surgeon to discuss surgical interventions.  Patient consents and referral placed as below.  Patient connected with the specialty  today in clinic with plans for patient to be seen within the next 1-2 days by the orthopedic surgeon.  -     Orthopedic  Referral; Future  -    "  HYDROcodone-acetaminophen (NORCO) 5-325 MG tablet; Take 1 tablet by mouth every 6 hours as needed for severe pain      Return if symptoms worsen or fail to improve.    The diagnoses, treatment options, risk, benefits, and recommendations were reviewed with patient/guardian.  Questions were answered to patient's/guardian satisfaction.  Red flag signs were reviewed.  Patient/guardian is in agreement with above plan.      Subjective: 78 year old female with history of hypertension, hyperlipidemia who presents to clinic for the following complaints:   Patient presents with:  Hospital F/U: ED    Patient was seen in the emergency room on 1/23/2024 after a fall.  Per ER note: History of Present Illness: Torri Lowry is a 78 y.o. female who presents to the ER for evaluation of left leg pain and bruising. Patient slipped and fell landed directly on her knee. She is accompanied by family member who provides interpretive services. She speaks Hmong. No head injury. No reported blood thinner use. No focal weakness or numbness. No abdominal pain or neck pain.  An x-ray was completed and demonstrated \"significant displaced patella fracture\" as interpreted by the ER doctor.  Patient was discharged with pain control medications (Norco) and constipation medication.  She was also given a brace and instructed to wear the brace.      Since then, the pain has been persistent, though slightly improved. Bruising was noted over the last few days, though has improved now.  The swelling was bad a few days ago, though has also improved.  She has been taking the constipation medication as prescribed.  Reviewed conservative management and recommendations as well and potential effects of the narcotic based pain medications.    At baseline, patient is very active.  She gardens often and does a lot of walking and moving around the house.  She would like to be able to maintain her level of physical activity after treatment for the patella " "fracture.    Patient presents with daughter-in-law.      The 10 point review of system is negative except as stated in the HPI.    Allergies were reviewed and updated.      X-Ray Knee Left Routine  Order: [id number]  Narrative    LEFT KNEE, 4 VIEWS    CLINICAL INFORMATION:fall fall    COMPARISON: None.    FINDINGS:    4 views of the left knee were performed.    There is an acute patellar fracture with fracture fragments  displaced approximately 4 cm.    There is NO evidence of fracture involving the distal femur,  proximal tibia, or proximal fibula.  Exam End: 01/23/24  2:20 PM    Specimen Collected: 01/23/24  2:55 PM Last Resulted: 01/23/24  2:56 PM   Received From: [facility]  Result Received: 01/31/24  8:21 AM         Objective:   /58   Pulse 60   Temp 97.8  F (36.6  C) (Oral)   Resp 16   Ht 1.463 m (4' 9.6\")   Wt 45.2 kg (99 lb 12 oz)   SpO2 99%   BMI 21.14 kg/m    General: Active, alert, nontoxic-appearing.  No acute distress.  HEENT: Normocephalic, atraumatic.  Pupils are equal and round.  Sclera is clear.  Normal external ears.  Respiratory/chest: Speaking in full sentences.  Breathing is not labored.  No accessory muscle usage.  Extremities: Left knee: in a leg brace. Patella is fractured with a separation of about 1.5 cm. Mild swelling is noted. Bruising is healing.  For worsening redness.  Cap refill less than 3 seconds.  Sensation to light touch intact.  Patient able to move toes without difficulty.  No calf tenderness noted to palpation.  Right lower extremity: Voluntary movements intact.  Integumentary: No concerning rash or skin changes appreciated.        Chris Fonseca MD  [facility] [place] [address]  Phone: [phone]  Fax: [phone]    1/31/2024  9:51 AM          Current Outpatient Medications   Medication    amLODIPine (NORVASC) 5 MG tablet    atorvastatin (LIPITOR) 20 MG tablet    cetirizine (ZYRTEC) 10 MG tablet    docusate sodium " (DSS) 100 MG capsule    HYDROcodone-acetaminophen (NORCO) 5-325 MG tablet    triamcinolone (KENALOG) 0.1 % external cream     No current facility-administered medications for this visit.       No Known Allergies    Patient Active Problem List    Diagnosis Date Noted    Hypertension, unspecified type 06/23/2023     Priority: Medium    Mixed hyperlipidemia 06/23/2023     Priority: Medium       Family History   Problem Relation Age of Onset    Diabetes No family hx of     Coronary Artery Disease No family hx of     Hyperlipidemia No family hx of        Past Surgical History:   Procedure Laterality Date    No previous surgery          Social History     Socioeconomic History    Marital status:      Spouse name: Not on file    Number of children: Not on file    Years of education: Not on file    Highest education level: Not on file   Occupational History    Not on file   Tobacco Use    Smoking status: Never     Passive exposure: Never    Smokeless tobacco: Never   Vaping Use    Vaping Use: Never used   Substance and Sexual Activity    Alcohol use: Not on file    Drug use: Not on file    Sexual activity: Not on file   Other Topics Concern    Not on file   Social History Narrative    Not on file     Social Determinants of Health     Financial Resource Strain: Low Risk  (1/31/2024)    Financial Resource Strain     Within the past 12 months, have you or your family members you live with been unable to get utilities (heat, electricity) when it was really needed?: No   Food Insecurity: Low Risk  (1/31/2024)    Food Insecurity     Within the past 12 months, did you worry that your food would run out before you got money to buy more?: No     Within the past 12 months, did the food you bought just not last and you didn t have money to get more?: No   Transportation Needs: Low Risk  (1/31/2024)    Transportation Needs     Within the past 12 months, has lack of transportation kept you from medical appointments, getting your  medicines, non-medical meetings or appointments, work, or from getting things that you need?: No   Physical Activity: Not on file   Stress: Not on file   Social Connections: Not on file   Interpersonal Safety: Not on file   Housing Stability: Low Risk  (1/31/2024)    Housing Stability     Do you have housing? : Yes     Are you worried about losing your housing?: No

## 2024-02-01 ENCOUNTER — OFFICE VISIT (OUTPATIENT)
Dept: ORTHOPEDICS | Facility: CLINIC | Age: 79
End: 2024-02-01
Attending: FAMILY MEDICINE
Payer: COMMERCIAL

## 2024-02-01 ENCOUNTER — TELEPHONE (OUTPATIENT)
Dept: ORTHOPEDICS | Facility: CLINIC | Age: 79
End: 2024-02-01

## 2024-02-01 ENCOUNTER — ANCILLARY PROCEDURE (OUTPATIENT)
Dept: GENERAL RADIOLOGY | Facility: CLINIC | Age: 79
End: 2024-02-01
Attending: STUDENT IN AN ORGANIZED HEALTH CARE EDUCATION/TRAINING PROGRAM
Payer: COMMERCIAL

## 2024-02-01 DIAGNOSIS — S82.032P CLOSED DISPLACED TRANSVERSE FRACTURE OF LEFT PATELLA WITH MALUNION, SUBSEQUENT ENCOUNTER: ICD-10-CM

## 2024-02-01 PROCEDURE — 73562 X-RAY EXAM OF KNEE 3: CPT | Mod: TC | Performed by: RADIOLOGY

## 2024-02-01 PROCEDURE — 99204 OFFICE O/P NEW MOD 45 MIN: CPT | Performed by: STUDENT IN AN ORGANIZED HEALTH CARE EDUCATION/TRAINING PROGRAM

## 2024-02-01 NOTE — LETTER
2/1/2024         RE: Torri Lowry  [address]  [place] [address]        Dear Colleague,    Thank you for referring your patient, Torri Lowry, to the [facility]. Please see a copy of my visit note below.    CC: Left Knee Injury    HPI: The use of a virtual  was utilized for this visit and I appreciate their assistance    Patient is a 78-year-old female seen here today with her adult son who presents with an acute left knee injury after a slip and fall on genera 23rd.  She reports that she slipped and fell onto her left knee on ice and territory third.  She injured her left knee.  She denies any other injury to the ankle or hip.  She denies any other injury.  She was seen at her primary care office.  X-rays at that time confirmed a displaced patella fracture.  She was provided a knee immobilizer and crutches.  She has been ambulating with the use of crutches.  She has not been placing any weight to the left leg.  She has been compliant with her knee immobilizer.  She is unable to perform straight leg raise    She takes medication for hypertension.  She is retired.  She does not smoke.  She enjoys gardening as a hobby and exercise.       Patient Active Problem List   Diagnosis     Hypertension, unspecified type     Mixed hyperlipidemia        No past medical history on file.       Past Surgical History:   Procedure Laterality Date     No previous surgery            Current Outpatient Medications   Medication Sig Dispense Refill     amLODIPine (NORVASC) 5 MG tablet Take 1 tablet (5 mg) by mouth daily 90 tablet 3     atorvastatin (LIPITOR) 20 MG tablet Take 1 tablet (20 mg) by mouth At Bedtime 90 tablet 3     cetirizine (ZYRTEC) 10 MG tablet Take 1 tablet (10 mg) by mouth daily as needed for allergies (itching.) 90 tablet 3     docusate sodium (DSS) 100 MG capsule Take 100 mg by mouth       HYDROcodone-acetaminophen (NORCO) 5-325 MG tablet Take 1 tablet by mouth  every 6 hours as needed for severe pain 21 tablet 0     triamcinolone (KENALOG) 0.1 % external cream Apply topically 2 times daily as needed for irritation (rash, itching.) 45 g 3        No Known Allergies       Family History   Problem Relation Age of Onset     Diabetes No family hx of      Coronary Artery Disease No family hx of      Hyperlipidemia No family hx of           Social History     Tobacco Use     Smoking status: Never     Passive exposure: Never     Smokeless tobacco: Never   Substance Use Topics     Alcohol use: Not on file            Objective:  Physical Exam:  LLE: No pain with axial load or logroll of the hip.  No open wounds, lacerations, or prior surgical incisions about the knee.  Moderate soft tissue edema.  Grade 2 knee effusion.  No pain to palpation over the quad tendon or patellar tendon.  Pain to palpation over the patella.  Noted deformity of the patella with proximal and distal fracture fragments.  No at risk or nonblanchable skin.  No pain to palpation over the medial or lateral joint line.  No pain to palpation over the femoral origin or tibial insertion of the MCL.  No pain to palpation over the femoral origin or fibular insertion of the LCL.  Passive range of motion 0 to 30 degrees of knee flexion limited by pain and effusion.  Unable to perform straight leg raise or hold leg extended against gravity..  Stable to varus and valgus stress at 0 and 30 degrees of knee flexion with firm endpoint.  Negative Lachman.  Grossly neurovascular intact.    Imaging:  3 view x-ray of the left knee from today was reviewed by myself.  This shows a significantly displaced transverse patellar fracture.  There is 3 cm of displacement.  On AP view there is suggestion of a vertical component of the inferior fracture fragment.  No other fracture or acute bony pathology noted of the distal femur and proximal tibia    Assessment and Plan: Patient is a 78-year-old female seen here today with her adult son  with an acute left patella fracture.  I had the opportunity to review her imaging with them today.  We discussed operative and nonoperative management.  We discussed nonoperative management in the form of continued knee immobilizer for 6 weeks followed by physical therapy.  I discussed with him that given the amount of displacement, there is almost certainly that this would go on to a nonunion.  With a displaced nonunited patella fracture, I cannot predict or promise any knee extension strength.  This is significantly limit her ability to ambulate.  This may require her to ambulate in a knee immobilizer for the remainder of her life.  We discussed operative management in the form of an open reduction internal fixation of her left patella.  I discussed operative technique including the use of screws and tension band wire.  I also discussed with him that I may need to utilize plate fixation if her fracture is comminuted.  We discussed risks and benefits of operative intervention including but not limited to bleeding, infection, failure to cure pain, stiffness, posttraumatic arthritis, extensor lag, loss of function, post operative DVT/PE, loss of limb and loss of life.  Discussed the postoperative protocol.  I had the opportunity answer questions today.  They would like to move forward with scheduling operative fixation of her left patella fracture.      Kenneth Price MD    [facility]   Department of Orthopedic Surgery       Again, thank you for allowing me to participate in the care of your patient.        Sincerely,        Kenneth Price MD

## 2024-02-01 NOTE — TELEPHONE ENCOUNTER
Patient has been scheduled for surgery. Details are below.    Date of Surgery: 02/09/24    Approximate Arrival Time: SURGERY CENTER WILL CALL 3/4 DAYS PRIOR TO CONFIRM A TIME   Surgeon:  DR. JESSICA ARIZMENDI     Procedure: PEN REDUCTION INTERNAL FIXATION, FRACTURE, PATELLA - Left     Location: Other: [facility] [address], [facility] [address]  Surgery Consult: NA  PreOp Physical: 02/07/24  PostOp: 02/15 & 03/14  Packet Mailed/MyChart Sent: NO, TOLD SON  Added to [place]: YES

## 2024-02-01 NOTE — PATIENT INSTRUCTIONS
[facility]   [address]  [place] [address] [address] [place] [address]   Appointments: [phone] Appointments: [phone]   Fax: [phone] Fax: [phone]       1. Closed displaced transverse fracture of left patella with malunion, subsequent encounter        SURGERY:  Schedule  surgery.   The Surgery Scheduler will contact you to assist with scheduling surgery.   You can contact her directly at [phone].       A pre-operative Physical with your primary care physician is required within 30 days of your scheduled proceedure  Physical Therapy will be scheduled       FORMS:   If you are needing any forms completed relating to your upcoming procedure, please send them to our office with a completed Release of Information.   Forms will be completed AFTER your procedure. A letter can be sent to your employer prior to surgery to inform them of your anticipated time off.    Please notify our staff if you would like a letter to do so.   Forms can be faxed directly to our clinic at [phone].     DO NOT BRING FORMS ON THE DATE OF SURGERY.           Call my office with any questions or concerns, [phone].

## 2024-02-01 NOTE — PROGRESS NOTES
CC: Left Knee Injury    HPI: The use of a virtual  was utilized for this visit and I appreciate their assistance    Patient is a 78-year-old female seen here today with her adult son who presents with an acute left knee injury after a slip and fall on genera 23rd.  She reports that she slipped and fell onto her left knee on ice and territory third.  She injured her left knee.  She denies any other injury to the ankle or hip.  She denies any other injury.  She was seen at her primary care office.  X-rays at that time confirmed a displaced patella fracture.  She was provided a knee immobilizer and crutches.  She has been ambulating with the use of crutches.  She has not been placing any weight to the left leg.  She has been compliant with her knee immobilizer.  She is unable to perform straight leg raise    She takes medication for hypertension.  She is retired.  She does not smoke.  She enjoys gardening as a hobby and exercise.       Patient Active Problem List   Diagnosis    Hypertension, unspecified type    Mixed hyperlipidemia        No past medical history on file.       Past Surgical History:   Procedure Laterality Date    No previous surgery            Current Outpatient Medications   Medication Sig Dispense Refill    amLODIPine (NORVASC) 5 MG tablet Take 1 tablet (5 mg) by mouth daily 90 tablet 3    atorvastatin (LIPITOR) 20 MG tablet Take 1 tablet (20 mg) by mouth At Bedtime 90 tablet 3    cetirizine (ZYRTEC) 10 MG tablet Take 1 tablet (10 mg) by mouth daily as needed for allergies (itching.) 90 tablet 3    docusate sodium (DSS) 100 MG capsule Take 100 mg by mouth      HYDROcodone-acetaminophen (NORCO) 5-325 MG tablet Take 1 tablet by mouth every 6 hours as needed for severe pain 21 tablet 0    triamcinolone (KENALOG) 0.1 % external cream Apply topically 2 times daily as needed for irritation (rash, itching.) 45 g 3        No Known Allergies       Family History   Problem Relation Age of  Onset    Diabetes No family hx of     Coronary Artery Disease No family hx of     Hyperlipidemia No family hx of           Social History     Tobacco Use    Smoking status: Never     Passive exposure: Never    Smokeless tobacco: Never   Substance Use Topics    Alcohol use: Not on file            Objective:  Physical Exam:  LLE: No pain with axial load or logroll of the hip.  No open wounds, lacerations, or prior surgical incisions about the knee.  Moderate soft tissue edema.  Grade 2 knee effusion.  No pain to palpation over the quad tendon or patellar tendon.  Pain to palpation over the patella.  Noted deformity of the patella with proximal and distal fracture fragments.  No at risk or nonblanchable skin.  No pain to palpation over the medial or lateral joint line.  No pain to palpation over the femoral origin or tibial insertion of the MCL.  No pain to palpation over the femoral origin or fibular insertion of the LCL.  Passive range of motion 0 to 30 degrees of knee flexion limited by pain and effusion.  Unable to perform straight leg raise or hold leg extended against gravity..  Stable to varus and valgus stress at 0 and 30 degrees of knee flexion with firm endpoint.  Negative Lachman.  Grossly neurovascular intact.    Imaging:  3 view x-ray of the left knee from today was reviewed by myself.  This shows a significantly displaced transverse patellar fracture.  There is 3 cm of displacement.  On AP view there is suggestion of a vertical component of the inferior fracture fragment.  No other fracture or acute bony pathology noted of the distal femur and proximal tibia    Assessment and Plan: Patient is a 78-year-old female seen here today with her adult son with an acute left patella fracture.  I had the opportunity to review her imaging with them today.  We discussed operative and nonoperative management.  We discussed nonoperative management in the form of continued knee immobilizer for 6 weeks followed by  physical therapy.  I discussed with him that given the amount of displacement, there is almost certainly that this would go on to a nonunion.  With a displaced nonunited patella fracture, I cannot predict or promise any knee extension strength.  This is significantly limit her ability to ambulate.  This may require her to ambulate in a knee immobilizer for the remainder of her life.  We discussed operative management in the form of an open reduction internal fixation of her left patella.  I discussed operative technique including the use of screws and tension band wire.  I also discussed with him that I may need to utilize plate fixation if her fracture is comminuted.  We discussed risks and benefits of operative intervention including but not limited to bleeding, infection, failure to cure pain, stiffness, posttraumatic arthritis, extensor lag, loss of function, post operative DVT/PE, loss of limb and loss of life.  Discussed the postoperative protocol.  I had the opportunity answer questions today.  They would like to move forward with scheduling operative fixation of her left patella fracture.      Kenneth Price MD    [facility]   Department of Orthopedic Surgery

## 2024-02-02 ENCOUNTER — TELEPHONE (OUTPATIENT)
Dept: ORTHOPEDICS | Facility: CLINIC | Age: 79
End: 2024-02-02
Payer: COMMERCIAL

## 2024-02-02 ENCOUNTER — APPOINTMENT (OUTPATIENT)
Dept: INTERPRETER SERVICES | Facility: CLINIC | Age: 79
End: 2024-02-02
Payer: COMMERCIAL

## 2024-02-02 DIAGNOSIS — S82.032P: Primary | ICD-10-CM

## 2024-02-02 DIAGNOSIS — Z98.890 S/P KNEE SURGERY: ICD-10-CM

## 2024-02-02 NOTE — TELEPHONE ENCOUNTER
Teaching Flowsheet   Relevant Diagnosis: left patella fracture  Teaching Topic: ORIF    Phoned patient with [facility] . Reviewed surgery instructions with patient.  Patient stayed on the line with  to help schedule her post op PT to start 1 week after surgery per Dr. Price.  Referral entered.  Post op Tscope brace ordered and coordinated with orthotics.      Person(s) involved in teaching:   Patient     Motivation Level:  Asks Questions: Yes  Eager to Learn: Yes  Cooperative: Yes  Receptive (willing/able to accept information): Yes  Any cultural factors/Denominational beliefs that may influence understanding or compliance? No  Comments: none     Patient demonstrates understanding of the following:  Reason for the appointment, diagnosis and treatment plan: Yes  Knowledge of proper use of medications and conditions for which they are ordered (with special attention to potential side effects or drug interactions): Yes  Which situations necessitate calling provider and whom to contact: Yes       Teaching Concerns Addressed:   Comments: none     Proper use and care of  (medical equip, care aids, etc.): Yes  Nutritional needs and diet plan: Yes  Pain management techniques: Yes  Wound Care: Yes  How and/when to access community resources: Yes     Instructional Materials Used/Given: surgery packet, soap     Time spent with patient: 45 minutes.

## 2024-02-07 ENCOUNTER — OFFICE VISIT (OUTPATIENT)
Dept: FAMILY MEDICINE | Facility: CLINIC | Age: 79
End: 2024-02-07
Payer: COMMERCIAL

## 2024-02-07 VITALS
TEMPERATURE: 97.8 F | HEIGHT: 57 IN | SYSTOLIC BLOOD PRESSURE: 132 MMHG | RESPIRATION RATE: 15 BRPM | OXYGEN SATURATION: 99 % | DIASTOLIC BLOOD PRESSURE: 70 MMHG | HEART RATE: 59 BPM | WEIGHT: 98.12 LBS | BODY MASS INDEX: 21.17 KG/M2

## 2024-02-07 DIAGNOSIS — Z29.11 NEED FOR VACCINATION AGAINST RESPIRATORY SYNCYTIAL VIRUS: ICD-10-CM

## 2024-02-07 DIAGNOSIS — S82.032D CLOSED DISPLACED TRANSVERSE FRACTURE OF LEFT PATELLA WITH ROUTINE HEALING, SUBSEQUENT ENCOUNTER: ICD-10-CM

## 2024-02-07 DIAGNOSIS — Z01.818 PREOP GENERAL PHYSICAL EXAM: Primary | ICD-10-CM

## 2024-02-07 DIAGNOSIS — I10 HYPERTENSION, UNSPECIFIED TYPE: ICD-10-CM

## 2024-02-07 LAB
ALBUMIN UR-MCNC: NEGATIVE MG/DL
APPEARANCE UR: CLEAR
ATRIAL RATE - MUSE: 54 BPM
BASOPHILS # BLD AUTO: 0 10E3/UL (ref 0–0.2)
BASOPHILS NFR BLD AUTO: 1 %
BILIRUB UR QL STRIP: NEGATIVE
COLOR UR AUTO: YELLOW
DIASTOLIC BLOOD PRESSURE - MUSE: NORMAL MMHG
EOSINOPHIL # BLD AUTO: 0 10E3/UL (ref 0–0.7)
EOSINOPHIL NFR BLD AUTO: 1 %
ERYTHROCYTE [DISTWIDTH] IN BLOOD BY AUTOMATED COUNT: 13.1 % (ref 10–15)
GLUCOSE UR STRIP-MCNC: NEGATIVE MG/DL
HCT VFR BLD AUTO: 38.9 % (ref 35–47)
HGB BLD-MCNC: 13.5 G/DL (ref 11.7–15.7)
HGB UR QL STRIP: NEGATIVE
IMM GRANULOCYTES # BLD: 0 10E3/UL
IMM GRANULOCYTES NFR BLD: 0 %
INTERPRETATION ECG - MUSE: NORMAL
KETONES UR STRIP-MCNC: NEGATIVE MG/DL
LEUKOCYTE ESTERASE UR QL STRIP: NEGATIVE
LYMPHOCYTES # BLD AUTO: 1 10E3/UL (ref 0.8–5.3)
LYMPHOCYTES NFR BLD AUTO: 19 %
MCH RBC QN AUTO: 32.1 PG (ref 26.5–33)
MCHC RBC AUTO-ENTMCNC: 34.7 G/DL (ref 31.5–36.5)
MCV RBC AUTO: 92 FL (ref 78–100)
MONOCYTES # BLD AUTO: 0.3 10E3/UL (ref 0–1.3)
MONOCYTES NFR BLD AUTO: 6 %
NEUTROPHILS # BLD AUTO: 3.7 10E3/UL (ref 1.6–8.3)
NEUTROPHILS NFR BLD AUTO: 74 %
NITRATE UR QL: NEGATIVE
P AXIS - MUSE: 59 DEGREES
PH UR STRIP: 5.5 [PH] (ref 5–7)
PLATELET # BLD AUTO: 244 10E3/UL (ref 150–450)
PR INTERVAL - MUSE: 162 MS
QRS DURATION - MUSE: 78 MS
QT - MUSE: 448 MS
QTC - MUSE: 424 MS
R AXIS - MUSE: 16 DEGREES
RBC # BLD AUTO: 4.21 10E6/UL (ref 3.8–5.2)
SP GR UR STRIP: <=1.005 (ref 1–1.03)
SYSTOLIC BLOOD PRESSURE - MUSE: NORMAL MMHG
T AXIS - MUSE: 37 DEGREES
UROBILINOGEN UR STRIP-ACNC: 0.2 E.U./DL
VENTRICULAR RATE- MUSE: 54 BPM
WBC # BLD AUTO: 5 10E3/UL (ref 4–11)

## 2024-02-07 PROCEDURE — 80053 COMPREHEN METABOLIC PANEL: CPT | Performed by: FAMILY MEDICINE

## 2024-02-07 PROCEDURE — 85025 COMPLETE CBC W/AUTO DIFF WBC: CPT | Performed by: FAMILY MEDICINE

## 2024-02-07 PROCEDURE — 80061 LIPID PANEL: CPT | Performed by: FAMILY MEDICINE

## 2024-02-07 PROCEDURE — 36415 COLL VENOUS BLD VENIPUNCTURE: CPT | Performed by: FAMILY MEDICINE

## 2024-02-07 PROCEDURE — 81003 URINALYSIS AUTO W/O SCOPE: CPT | Performed by: FAMILY MEDICINE

## 2024-02-07 PROCEDURE — 93005 ELECTROCARDIOGRAM TRACING: CPT | Performed by: FAMILY MEDICINE

## 2024-02-07 PROCEDURE — 99215 OFFICE O/P EST HI 40 MIN: CPT | Mod: 25 | Performed by: FAMILY MEDICINE

## 2024-02-07 RX ORDER — RESPIRATORY SYNCYTIAL VIRUS VACCINE 120MCG/0.5
0.5 KIT INTRAMUSCULAR ONCE
Qty: 1 EACH | Refills: 0 | Status: CANCELLED | OUTPATIENT
Start: 2024-02-07 | End: 2024-02-07

## 2024-02-07 NOTE — PROGRESS NOTES
Preoperative Evaluation  [address]  [place] [address]  Phone: [phone]  Fax: [phone]  Primary Provider: No Ref-Primary, Physician  Pre-op Performing Provider: DARLENE FARRAR  Feb 7, 2024       Torri is a 78 year old, presenting for the following:  Pre-Op Exam        2/7/2024    10:34 AM   Additional Questions   Roomed by ANKIT ORNELAS CMA   Accompanied by DAUGHTER-IN-LAW     Surgical Information  Surgery/Procedure: OPEN REDUCTION INTERNAL FIXATION, FRACTURE, PATELLA   Surgery Location: Location: [facility] Main OR   Surgeon: Kenneth Price MD   Surgery Date: 02/09/2024  Time of Surgery:     Time: 10:20 AM   Where patient plans to recover: At home with family  Fax number for surgical facility: Note does not need to be faxed, will be available electronically in Epic.    Assessment & Plan     The proposed surgical procedure is considered INTERMEDIATE risk.    Preop general physical exam  She is cleared for surgery to fix her patella  - EKG 12-lead, tracing only  - CBC with platelets and differential  - Comprehensive metabolic panel (BMP + Alb, Alk Phos, ALT, AST, Total. Bili, TP)  - UA Macroscopic with reflex to Microscopic and Culture - Lab Collect  - Lipid panel reflex to direct LDL Non-fasting  - CBC with platelets and differential  - Comprehensive metabolic panel (BMP + Alb, Alk Phos, ALT, AST, Total. Bili, TP)  - Lipid panel reflex to direct LDL Non-fasting  - UA Macroscopic with reflex to Microscopic and Culture - Lab Collect    Closed nondisplaced fracture of left patella with routine healing, unspecified fracture morphology, subsequent encounter  Reason for the surgery. Provider doing pre-op is uncertain of the actual type of fracture    Need for vaccination against respiratory syncytial virus  declined    Hypertension, unspecified type  Under control with medication    Risks and Recommendations  The patient has the following additional risks and  recommendations for perioperative complications:   - No identified additional risk factors other than previously addressed   - provider doing pre-op wants to note that this patient is not well known to anyone at the clinic where the preop was completed. It is optimal when a doctor knows a patient over time in order to recommend they are ready for the surgery.    Antiplatelet or Anticoagulation Medication Instructions   - Patient is on no antiplatelet or anticoagulation medications.    Additional Medication Instructions  Patient is to take NO medications the day of the surgey.    Recommendation  APPROVAL GIVEN to proceed with proposed procedure, without further diagnostic evaluation.    Review of external notes as documented elsewhere in note  Ordering of each unique test  Prescription drug management  40 minutes spent by me on the date of the encounter doing chart review, history and exam, documentation and further activities per the note            Subjective       HPI related to upcoming procedure: REALLY WANTS TO HEAL        2/7/2024    10:22 AM   Preop Questions   1. Have you ever had a heart attack or stroke? No   2. Have you ever had surgery on your heart or blood vessels, such as a stent placement, a coronary artery bypass, or surgery on an artery in your head, neck, heart, or legs? No   3. Do you have chest pain with activity? No   4. Do you have a history of  heart failure? No   5. Do you currently have a cold, bronchitis or symptoms of other infection? No   6. Do you have a cough, shortness of breath, or wheezing? No   7. Do you or anyone in your family have previous history of blood clots? No   8. Do you or does anyone in your family have a serious bleeding problem such as prolonged bleeding following surgeries or cuts? No   9. Have you ever had problems with anemia or been told to take iron pills? No   10. Have you had any abnormal blood loss such as black, tarry or bloody stools, or abnormal vaginal  bleeding? No   11. Have you ever had a blood transfusion? No   12. Are you willing to have a blood transfusion if it is medically needed before, during, or after your surgery? Yes   13. Have you or any of your relatives ever had problems with anesthesia? No   14. Do you have sleep apnea, excessive snoring or daytime drowsiness? No   15. Do you have any artifical heart valves or other implanted medical devices like a pacemaker, defibrillator, or continuous glucose monitor? No   16. Do you have artificial joints? UNKNOWN - no   17. Are you allergic to latex? No       Health Care Directive  Patient does not have a Health Care Directive or Living Will: Advance Directive received and scanned. Click on Code in the patient header to view.    Preoperative Review of    reviewed - controlled substances reflected in medication list.        Patient Active Problem List    Diagnosis Date Noted    Hypertension, unspecified type 06/23/2023     Priority: Medium    Mixed hyperlipidemia 06/23/2023     Priority: Medium      Past Medical History:   Diagnosis Date    Hypertension      Past Surgical History:   Procedure Laterality Date    No previous surgery       Current Outpatient Medications   Medication Sig Dispense Refill    amLODIPine (NORVASC) 5 MG tablet Take 1 tablet (5 mg) by mouth daily 90 tablet 3    atorvastatin (LIPITOR) 20 MG tablet Take 1 tablet (20 mg) by mouth At Bedtime 90 tablet 3    cetirizine (ZYRTEC) 10 MG tablet Take 1 tablet (10 mg) by mouth daily as needed for allergies (itching.) 90 tablet 3    triamcinolone (KENALOG) 0.1 % external cream Apply topically 2 times daily as needed for irritation (rash, itching.) 45 g 3    HYDROcodone-acetaminophen (NORCO) 5-325 MG tablet Take 1 tablet by mouth every 6 hours as needed for severe pain (Patient not taking: Reported on 2/7/2024) 21 tablet 0       No Known Allergies     Social History     Tobacco Use    Smoking status: Never     Passive exposure: Never     "Smokeless tobacco: Never   Substance Use Topics    Alcohol use: Not on file       History   Drug Use Not on file         Review of Systems    Objective    BP (!) 144/74 (BP Location: Left arm, Patient Position: Sitting, Cuff Size: Adult Regular)   Pulse 60   Temp 97.8  F (36.6  C) (Oral)   Resp 15   Ht 1.454 m (4' 9.25\")   Wt 44.5 kg (98 lb 1.9 oz)   SpO2 99%   BMI 21.05 kg/m     Estimated body mass index is 21.05 kg/m  as calculated from the following:    Height as of this encounter: 1.454 m (4' 9.25\").    Weight as of this encounter: 44.5 kg (98 lb 1.9 oz).  Physical Exam  GENERAL: alert and no distress  EYES: Eyes grossly normal to inspection, PERRL and conjunctivae and sclerae normal  HENT: ear canals and TM's normal, nose and mouth without ulcers or lesions  NECK: no adenopathy, no asymmetry, masses, or scars  RESP: lungs clear to auscultation - no rales, rhonchi or wheezes  CV: regular rate and rhythm, normal S1 S2, no S3 or S4, no murmur, click or rub, no peripheral edema  ABDOMEN: soft, nontender, no hepatosplenomegaly, no masses and bowel sounds normal  SKIN: no suspicious lesions or rashes  PSYCH: mentation appears normal, affect flat, judgement and insight intact, and appearance well groomed    Recent Labs   Lab Test 06/23/23  0956   HGB 14.7         POTASSIUM 4.1   CR 0.82        Diagnostics  Recent Results (from the past 168 hour(s))   Comprehensive metabolic panel (BMP + Alb, Alk Phos, ALT, AST, Total. Bili, TP)    Collection Time: 02/07/24 11:44 AM   Result Value Ref Range    Sodium 137 135 - 145 mmol/L    Potassium 4.4 3.4 - 5.3 mmol/L    Carbon Dioxide (CO2) 27 22 - 29 mmol/L    Anion Gap 9 7 - 15 mmol/L    Urea Nitrogen 19.7 8.0 - 23.0 mg/dL    Creatinine 0.77 0.51 - 0.95 mg/dL    GFR Estimate 79 >60 mL/min/1.73m2    Calcium 9.6 8.8 - 10.2 mg/dL    Chloride 101 98 - 107 mmol/L    Glucose 110 (H) 70 - 99 mg/dL    Alkaline Phosphatase 80 40 - 150 U/L    AST 27 0 - 45 U/L    ALT 24 " 0 - 50 U/L    Protein Total 7.0 6.4 - 8.3 g/dL    Albumin 4.0 3.5 - 5.2 g/dL    Bilirubin Total 0.8 <=1.2 mg/dL   Lipid panel reflex to direct LDL Non-fasting    Collection Time: 02/07/24 11:44 AM   Result Value Ref Range    Cholesterol 241 (H) <200 mg/dL    Triglycerides 241 (H) <150 mg/dL    Direct Measure HDL 49 (L) >=50 mg/dL    LDL Cholesterol Calculated 144 (H) <=100 mg/dL    Non HDL Cholesterol 192 (H) <130 mg/dL    Patient Fasting > 8hrs? No    CBC with platelets and differential    Collection Time: 02/07/24 11:44 AM   Result Value Ref Range    WBC Count 5.0 4.0 - 11.0 10e3/uL    RBC Count 4.21 3.80 - 5.20 10e6/uL    Hemoglobin 13.5 11.7 - 15.7 g/dL    Hematocrit 38.9 35.0 - 47.0 %    MCV 92 78 - 100 fL    MCH 32.1 26.5 - 33.0 pg    MCHC 34.7 31.5 - 36.5 g/dL    RDW 13.1 10.0 - 15.0 %    Platelet Count 244 150 - 450 10e3/uL    % Neutrophils 74 %    % Lymphocytes 19 %    % Monocytes 6 %    % Eosinophils 1 %    % Basophils 1 %    % Immature Granulocytes 0 %    Absolute Neutrophils 3.7 1.6 - 8.3 10e3/uL    Absolute Lymphocytes 1.0 0.8 - 5.3 10e3/uL    Absolute Monocytes 0.3 0.0 - 1.3 10e3/uL    Absolute Eosinophils 0.0 0.0 - 0.7 10e3/uL    Absolute Basophils 0.0 0.0 - 0.2 10e3/uL    Absolute Immature Granulocytes 0.0 <=0.4 10e3/uL   UA Macroscopic with reflex to Microscopic and Culture - Lab Collect    Collection Time: 02/07/24 12:02 PM    Specimen: Urine, Clean Catch   Result Value Ref Range    Color Urine Yellow Colorless, Straw, Light Yellow, Yellow    Appearance Urine Clear Clear    Glucose Urine Negative Negative mg/dL    Bilirubin Urine Negative Negative    Ketones Urine Negative Negative mg/dL    Specific Gravity Urine <=1.005 1.005 - 1.030    Blood Urine Negative Negative    pH Urine 5.5 5.0 - 7.0    Protein Albumin Urine Negative Negative mg/dL    Urobilinogen Urine 0.2 0.2, 1.0 E.U./dL    Nitrite Urine Negative Negative    Leukocyte Esterase Urine Negative Negative   EKG 12-lead, tracing only     Collection Time: 02/07/24 12:29 PM   Result Value Ref Range    Systolic Blood Pressure  mmHg    Diastolic Blood Pressure  mmHg    Ventricular Rate 54 BPM    Atrial Rate 54 BPM    RI Interval 162 ms    QRS Duration 78 ms     ms    QTc 424 ms    P Axis 59 degrees    R AXIS 16 degrees    T Axis 37 degrees    Interpretation ECG       Sinus bradycardia  Otherwise normal ECG  When compared with ECG of 25-DEC-2013 15:21,  No significant change was found        EKG: sinus bradycardia, no LVH by voltage criteria    Revised Cardiac Risk Index (RCRI)  The patient has the following serious cardiovascular risks for perioperative complications:   - No serious cardiac risks = 0 points     RCRI Interpretation: 1 point: Class II (low risk - 0.9% complication rate)         Signed Electronically by: Nadege Gonsalez MD  Copy of this evaluation report is provided to requesting physician.

## 2024-02-08 ENCOUNTER — TELEPHONE (OUTPATIENT)
Dept: FAMILY MEDICINE | Facility: CLINIC | Age: 79
End: 2024-02-08
Payer: COMMERCIAL

## 2024-02-08 ENCOUNTER — ANESTHESIA EVENT (OUTPATIENT)
Dept: SURGERY | Facility: CLINIC | Age: 79
End: 2024-02-08
Payer: COMMERCIAL

## 2024-02-08 LAB
ALBUMIN SERPL BCG-MCNC: 4 G/DL (ref 3.5–5.2)
ALP SERPL-CCNC: 80 U/L (ref 40–150)
ALT SERPL W P-5'-P-CCNC: 24 U/L (ref 0–50)
ANION GAP SERPL CALCULATED.3IONS-SCNC: 9 MMOL/L (ref 7–15)
AST SERPL W P-5'-P-CCNC: 27 U/L (ref 0–45)
BILIRUB SERPL-MCNC: 0.8 MG/DL
BUN SERPL-MCNC: 19.7 MG/DL (ref 8–23)
CALCIUM SERPL-MCNC: 9.6 MG/DL (ref 8.8–10.2)
CHLORIDE SERPL-SCNC: 101 MMOL/L (ref 98–107)
CHOLEST SERPL-MCNC: 241 MG/DL
CREAT SERPL-MCNC: 0.77 MG/DL (ref 0.51–0.95)
DEPRECATED HCO3 PLAS-SCNC: 27 MMOL/L (ref 22–29)
EGFRCR SERPLBLD CKD-EPI 2021: 79 ML/MIN/1.73M2
FASTING STATUS PATIENT QL REPORTED: NO
GLUCOSE SERPL-MCNC: 110 MG/DL (ref 70–99)
HDLC SERPL-MCNC: 49 MG/DL
LDLC SERPL CALC-MCNC: 144 MG/DL
NONHDLC SERPL-MCNC: 192 MG/DL
POTASSIUM SERPL-SCNC: 4.4 MMOL/L (ref 3.4–5.3)
PROT SERPL-MCNC: 7 G/DL (ref 6.4–8.3)
SODIUM SERPL-SCNC: 137 MMOL/L (ref 135–145)
TRIGL SERPL-MCNC: 241 MG/DL

## 2024-02-08 NOTE — TELEPHONE ENCOUNTER
General Call    Contacts         Type Contact Phone/Fax    02/08/2024 01:49 PM CST Phone (Incoming) Torri Lowry (Self) [phone] (H)     [facility] calling to have provider sign off on pre-op paperwork in epic          Reason for Call:  [facility] - a coordinator called to inform provider they need pt's pre-op paperwork signed off in [facility]. Pt's procedure is scheduled tomorrow 02/09/2024    What are your questions or concerns:  N/A    Date of last appointment with provider: 02/07/2024    Okay to leave a detailed message?: No - [phone] for Nurse at [facility] if need be

## 2024-02-09 ENCOUNTER — APPOINTMENT (OUTPATIENT)
Dept: RADIOLOGY | Facility: CLINIC | Age: 79
End: 2024-02-09
Attending: STUDENT IN AN ORGANIZED HEALTH CARE EDUCATION/TRAINING PROGRAM
Payer: COMMERCIAL

## 2024-02-09 ENCOUNTER — HOSPITAL ENCOUNTER (OUTPATIENT)
Facility: CLINIC | Age: 79
Discharge: HOME | End: 2024-02-09
Attending: STUDENT IN AN ORGANIZED HEALTH CARE EDUCATION/TRAINING PROGRAM | Admitting: STUDENT IN AN ORGANIZED HEALTH CARE EDUCATION/TRAINING PROGRAM
Payer: COMMERCIAL

## 2024-02-09 ENCOUNTER — ANESTHESIA (OUTPATIENT)
Dept: SURGERY | Facility: CLINIC | Age: 79
End: 2024-02-09
Payer: COMMERCIAL

## 2024-02-09 VITALS
DIASTOLIC BLOOD PRESSURE: 69 MMHG | HEIGHT: 57 IN | TEMPERATURE: 97.3 F | HEART RATE: 77 BPM | BODY MASS INDEX: 21.14 KG/M2 | SYSTOLIC BLOOD PRESSURE: 152 MMHG | OXYGEN SATURATION: 98 % | RESPIRATION RATE: 18 BRPM | WEIGHT: 98 LBS

## 2024-02-09 DIAGNOSIS — S82.032A CLOSED DISPLACED TRANSVERSE FRACTURE OF LEFT PATELLA, INITIAL ENCOUNTER: Primary | ICD-10-CM

## 2024-02-09 PROCEDURE — 258N000003 HC RX IP 258 OP 636: Performed by: STUDENT IN AN ORGANIZED HEALTH CARE EDUCATION/TRAINING PROGRAM

## 2024-02-09 PROCEDURE — 999N000141 HC STATISTIC PRE-PROCEDURE NURSING ASSESSMENT: Performed by: STUDENT IN AN ORGANIZED HEALTH CARE EDUCATION/TRAINING PROGRAM

## 2024-02-09 PROCEDURE — 250N000009 HC RX 250: Performed by: STUDENT IN AN ORGANIZED HEALTH CARE EDUCATION/TRAINING PROGRAM

## 2024-02-09 PROCEDURE — 250N000011 HC RX IP 250 OP 636: Performed by: STUDENT IN AN ORGANIZED HEALTH CARE EDUCATION/TRAINING PROGRAM

## 2024-02-09 PROCEDURE — 272N000001 HC OR GENERAL SUPPLY STERILE: Performed by: STUDENT IN AN ORGANIZED HEALTH CARE EDUCATION/TRAINING PROGRAM

## 2024-02-09 PROCEDURE — 710N000012 HC RECOVERY PHASE 2, PER MINUTE: Performed by: STUDENT IN AN ORGANIZED HEALTH CARE EDUCATION/TRAINING PROGRAM

## 2024-02-09 PROCEDURE — C1713 ANCHOR/SCREW BN/BN,TIS/BN: HCPCS | Performed by: STUDENT IN AN ORGANIZED HEALTH CARE EDUCATION/TRAINING PROGRAM

## 2024-02-09 PROCEDURE — 710N000010 HC RECOVERY PHASE 1, LEVEL 2, PER MIN: Performed by: STUDENT IN AN ORGANIZED HEALTH CARE EDUCATION/TRAINING PROGRAM

## 2024-02-09 PROCEDURE — 250N000013 HC RX MED GY IP 250 OP 250 PS 637: Performed by: STUDENT IN AN ORGANIZED HEALTH CARE EDUCATION/TRAINING PROGRAM

## 2024-02-09 PROCEDURE — 250N000011 HC RX IP 250 OP 636: Performed by: NURSE ANESTHETIST, CERTIFIED REGISTERED

## 2024-02-09 PROCEDURE — C1894 INTRO/SHEATH, NON-LASER: HCPCS | Performed by: STUDENT IN AN ORGANIZED HEALTH CARE EDUCATION/TRAINING PROGRAM

## 2024-02-09 PROCEDURE — 360N000077 HC SURGERY LEVEL 4, PER MIN: Performed by: STUDENT IN AN ORGANIZED HEALTH CARE EDUCATION/TRAINING PROGRAM

## 2024-02-09 PROCEDURE — 999N000182 XR SURGERY CARM FLUORO GREATER THAN 5 MIN: Mod: TC

## 2024-02-09 PROCEDURE — 370N000017 HC ANESTHESIA TECHNICAL FEE, PER MIN: Performed by: STUDENT IN AN ORGANIZED HEALTH CARE EDUCATION/TRAINING PROGRAM

## 2024-02-09 DEVICE — IMPLANTABLE DEVICE: Type: IMPLANTABLE DEVICE | Site: KNEE | Status: FUNCTIONAL

## 2024-02-09 RX ORDER — KETOROLAC TROMETHAMINE 30 MG/ML
15 INJECTION, SOLUTION INTRAMUSCULAR; INTRAVENOUS
Status: DISCONTINUED | OUTPATIENT
Start: 2024-02-09 | End: 2024-02-09 | Stop reason: HOSPADM

## 2024-02-09 RX ORDER — ONDANSETRON 2 MG/ML
INJECTION INTRAMUSCULAR; INTRAVENOUS PRN
Status: DISCONTINUED | OUTPATIENT
Start: 2024-02-09 | End: 2024-02-09

## 2024-02-09 RX ORDER — ONDANSETRON 4 MG/1
4 TABLET, ORALLY DISINTEGRATING ORAL EVERY 8 HOURS PRN
Qty: 10 TABLET | Refills: 0 | Status: SHIPPED | OUTPATIENT
Start: 2024-02-09

## 2024-02-09 RX ORDER — KETOROLAC TROMETHAMINE 30 MG/ML
15 INJECTION, SOLUTION INTRAMUSCULAR; INTRAVENOUS EVERY 6 HOURS PRN
Status: DISCONTINUED | OUTPATIENT
Start: 2024-02-09 | End: 2024-02-09 | Stop reason: HOSPADM

## 2024-02-09 RX ORDER — ACETAMINOPHEN 325 MG/1
975 TABLET ORAL ONCE
Status: COMPLETED | OUTPATIENT
Start: 2024-02-09 | End: 2024-02-09

## 2024-02-09 RX ORDER — FENTANYL CITRATE 50 UG/ML
50 INJECTION, SOLUTION INTRAMUSCULAR; INTRAVENOUS EVERY 5 MIN PRN
Status: DISCONTINUED | OUTPATIENT
Start: 2024-02-09 | End: 2024-02-09 | Stop reason: HOSPADM

## 2024-02-09 RX ORDER — MEPERIDINE HYDROCHLORIDE 25 MG/ML
12.5 INJECTION INTRAMUSCULAR; INTRAVENOUS; SUBCUTANEOUS EVERY 5 MIN PRN
Status: DISCONTINUED | OUTPATIENT
Start: 2024-02-09 | End: 2024-02-09 | Stop reason: HOSPADM

## 2024-02-09 RX ORDER — ACETAMINOPHEN 325 MG/1
650 TABLET ORAL
Status: DISCONTINUED | OUTPATIENT
Start: 2024-02-09 | End: 2024-02-09 | Stop reason: HOSPADM

## 2024-02-09 RX ORDER — ONDANSETRON 2 MG/ML
4 INJECTION INTRAMUSCULAR; INTRAVENOUS EVERY 30 MIN PRN
Status: DISCONTINUED | OUTPATIENT
Start: 2024-02-09 | End: 2024-02-09 | Stop reason: HOSPADM

## 2024-02-09 RX ORDER — ONDANSETRON 4 MG/1
4 TABLET, ORALLY DISINTEGRATING ORAL EVERY 30 MIN PRN
Status: DISCONTINUED | OUTPATIENT
Start: 2024-02-09 | End: 2024-02-09 | Stop reason: HOSPADM

## 2024-02-09 RX ORDER — SODIUM CHLORIDE, SODIUM LACTATE, POTASSIUM CHLORIDE, CALCIUM CHLORIDE 600; 310; 30; 20 MG/100ML; MG/100ML; MG/100ML; MG/100ML
INJECTION, SOLUTION INTRAVENOUS CONTINUOUS
Status: DISCONTINUED | OUTPATIENT
Start: 2024-02-09 | End: 2024-02-09 | Stop reason: HOSPADM

## 2024-02-09 RX ORDER — PROPOFOL 10 MG/ML
INJECTION, EMULSION INTRAVENOUS PRN
Status: DISCONTINUED | OUTPATIENT
Start: 2024-02-09 | End: 2024-02-09

## 2024-02-09 RX ORDER — LIDOCAINE 40 MG/G
CREAM TOPICAL
Status: DISCONTINUED | OUTPATIENT
Start: 2024-02-09 | End: 2024-02-09 | Stop reason: HOSPADM

## 2024-02-09 RX ORDER — OXYCODONE HYDROCHLORIDE 5 MG/1
5 TABLET ORAL
Status: DISCONTINUED | OUTPATIENT
Start: 2024-02-09 | End: 2024-02-09 | Stop reason: HOSPADM

## 2024-02-09 RX ORDER — OXYCODONE HYDROCHLORIDE 5 MG/1
5-10 TABLET ORAL EVERY 4 HOURS PRN
Qty: 30 TABLET | Refills: 0 | Status: SHIPPED | OUTPATIENT
Start: 2024-02-09 | End: 2024-08-14

## 2024-02-09 RX ORDER — LORAZEPAM 2 MG/ML
.5-1 INJECTION INTRAMUSCULAR
Status: DISCONTINUED | OUTPATIENT
Start: 2024-02-09 | End: 2024-02-09 | Stop reason: HOSPADM

## 2024-02-09 RX ORDER — ACETAMINOPHEN 325 MG/1
650 TABLET ORAL EVERY 6 HOURS PRN
Qty: 50 TABLET | Refills: 0 | Status: SHIPPED | OUTPATIENT
Start: 2024-02-09 | End: 2024-08-14

## 2024-02-09 RX ORDER — HYDROMORPHONE HCL IN WATER/PF 6 MG/30 ML
0.4 PATIENT CONTROLLED ANALGESIA SYRINGE INTRAVENOUS EVERY 5 MIN PRN
Status: DISCONTINUED | OUTPATIENT
Start: 2024-02-09 | End: 2024-02-09 | Stop reason: HOSPADM

## 2024-02-09 RX ORDER — FENTANYL CITRATE 50 UG/ML
25 INJECTION, SOLUTION INTRAMUSCULAR; INTRAVENOUS EVERY 5 MIN PRN
Status: DISCONTINUED | OUTPATIENT
Start: 2024-02-09 | End: 2024-02-09 | Stop reason: HOSPADM

## 2024-02-09 RX ORDER — HYDROCODONE BITARTRATE AND ACETAMINOPHEN 5; 325 MG/1; MG/1
1 TABLET ORAL EVERY 6 HOURS PRN
Status: ON HOLD | COMMUNITY
End: 2024-02-09

## 2024-02-09 RX ORDER — MAGNESIUM HYDROXIDE 1200 MG/15ML
LIQUID ORAL PRN
Status: DISCONTINUED | OUTPATIENT
Start: 2024-02-09 | End: 2024-02-09 | Stop reason: HOSPADM

## 2024-02-09 RX ORDER — CEFAZOLIN SODIUM 1 G/3ML
INJECTION, POWDER, FOR SOLUTION INTRAMUSCULAR; INTRAVENOUS PRN
Status: DISCONTINUED | OUTPATIENT
Start: 2024-02-09 | End: 2024-02-09

## 2024-02-09 RX ORDER — BUPIVACAINE HYDROCHLORIDE 2.5 MG/ML
INJECTION, SOLUTION EPIDURAL; INFILTRATION; INTRACAUDAL
Status: COMPLETED | OUTPATIENT
Start: 2024-02-09 | End: 2024-02-09

## 2024-02-09 RX ORDER — ONDANSETRON 4 MG/1
4 TABLET, ORALLY DISINTEGRATING ORAL
Status: DISCONTINUED | OUTPATIENT
Start: 2024-02-09 | End: 2024-02-09 | Stop reason: HOSPADM

## 2024-02-09 RX ORDER — OXYCODONE HYDROCHLORIDE 5 MG/1
10 TABLET ORAL
Status: COMPLETED | OUTPATIENT
Start: 2024-02-09 | End: 2024-02-09

## 2024-02-09 RX ORDER — FENTANYL CITRATE 50 UG/ML
INJECTION, SOLUTION INTRAMUSCULAR; INTRAVENOUS PRN
Status: DISCONTINUED | OUTPATIENT
Start: 2024-02-09 | End: 2024-02-09

## 2024-02-09 RX ORDER — HALOPERIDOL 5 MG/ML
1 INJECTION INTRAMUSCULAR
Status: DISCONTINUED | OUTPATIENT
Start: 2024-02-09 | End: 2024-02-09 | Stop reason: HOSPADM

## 2024-02-09 RX ORDER — PROPOFOL 10 MG/ML
INJECTION, EMULSION INTRAVENOUS CONTINUOUS PRN
Status: DISCONTINUED | OUTPATIENT
Start: 2024-02-09 | End: 2024-02-09

## 2024-02-09 RX ORDER — ASPIRIN 81 MG/1
162 TABLET ORAL DAILY
Qty: 90 TABLET | Refills: 0 | Status: SHIPPED | OUTPATIENT
Start: 2024-02-09 | End: 2024-08-14

## 2024-02-09 RX ORDER — DEXAMETHASONE SODIUM PHOSPHATE 10 MG/ML
INJECTION, SOLUTION INTRAMUSCULAR; INTRAVENOUS PRN
Status: DISCONTINUED | OUTPATIENT
Start: 2024-02-09 | End: 2024-02-09

## 2024-02-09 RX ORDER — AMOXICILLIN 250 MG
1-2 CAPSULE ORAL 2 TIMES DAILY
Qty: 30 TABLET | Refills: 0 | Status: SHIPPED | OUTPATIENT
Start: 2024-02-09

## 2024-02-09 RX ORDER — FENTANYL CITRATE 50 UG/ML
25-100 INJECTION, SOLUTION INTRAMUSCULAR; INTRAVENOUS
Status: DISCONTINUED | OUTPATIENT
Start: 2024-02-09 | End: 2024-02-09 | Stop reason: HOSPADM

## 2024-02-09 RX ORDER — HYDROMORPHONE HCL IN WATER/PF 6 MG/30 ML
0.2 PATIENT CONTROLLED ANALGESIA SYRINGE INTRAVENOUS EVERY 5 MIN PRN
Status: DISCONTINUED | OUTPATIENT
Start: 2024-02-09 | End: 2024-02-09 | Stop reason: HOSPADM

## 2024-02-09 RX ADMIN — DEXAMETHASONE SODIUM PHOSPHATE 10 MG: 10 INJECTION, SOLUTION INTRAMUSCULAR; INTRAVENOUS at 11:14

## 2024-02-09 RX ADMIN — ONDANSETRON 4 MG: 2 INJECTION INTRAMUSCULAR; INTRAVENOUS at 11:14

## 2024-02-09 RX ADMIN — PROPOFOL 150 MCG/KG/MIN: 10 INJECTION, EMULSION INTRAVENOUS at 10:51

## 2024-02-09 RX ADMIN — FENTANYL CITRATE 25 MCG: 50 INJECTION INTRAMUSCULAR; INTRAVENOUS at 12:10

## 2024-02-09 RX ADMIN — ACETAMINOPHEN 975 MG: 325 TABLET ORAL at 08:39

## 2024-02-09 RX ADMIN — SODIUM CHLORIDE, POTASSIUM CHLORIDE, SODIUM LACTATE AND CALCIUM CHLORIDE: 600; 310; 30; 20 INJECTION, SOLUTION INTRAVENOUS at 12:15

## 2024-02-09 RX ADMIN — KETOROLAC TROMETHAMINE 15 MG: 30 INJECTION INTRAMUSCULAR; INTRAVENOUS at 15:38

## 2024-02-09 RX ADMIN — FENTANYL CITRATE 25 MCG: 50 INJECTION INTRAMUSCULAR; INTRAVENOUS at 13:41

## 2024-02-09 RX ADMIN — FENTANYL CITRATE 25 MCG: 50 INJECTION INTRAMUSCULAR; INTRAVENOUS at 11:41

## 2024-02-09 RX ADMIN — PROPOFOL 80 MG: 10 INJECTION, EMULSION INTRAVENOUS at 10:56

## 2024-02-09 RX ADMIN — FENTANYL CITRATE 50 MCG: 50 INJECTION INTRAMUSCULAR; INTRAVENOUS at 11:11

## 2024-02-09 RX ADMIN — BUPIVACAINE HYDROCHLORIDE 15 ML: 2.5 INJECTION, SOLUTION EPIDURAL; INFILTRATION; INTRACAUDAL at 09:19

## 2024-02-09 RX ADMIN — FENTANYL CITRATE 25 MCG: 50 INJECTION INTRAMUSCULAR; INTRAVENOUS at 13:28

## 2024-02-09 RX ADMIN — ACETAMINOPHEN 650 MG: 325 TABLET ORAL at 14:59

## 2024-02-09 RX ADMIN — PROPOFOL 50 MG: 10 INJECTION, EMULSION INTRAVENOUS at 11:10

## 2024-02-09 RX ADMIN — OXYCODONE HYDROCHLORIDE 10 MG: 5 TABLET ORAL at 14:42

## 2024-02-09 RX ADMIN — SODIUM CHLORIDE, POTASSIUM CHLORIDE, SODIUM LACTATE AND CALCIUM CHLORIDE: 600; 310; 30; 20 INJECTION, SOLUTION INTRAVENOUS at 09:04

## 2024-02-09 RX ADMIN — FENTANYL CITRATE 50 MCG: 50 INJECTION INTRAMUSCULAR; INTRAVENOUS at 09:13

## 2024-02-09 RX ADMIN — CEFAZOLIN 2 G: 1 INJECTION, POWDER, FOR SOLUTION INTRAMUSCULAR; INTRAVENOUS at 11:00

## 2024-02-09 ASSESSMENT — ACTIVITIES OF DAILY LIVING (ADL)
ADLS_ACUITY_SCORE: 29
ADLS_ACUITY_SCORE: 28
ADLS_ACUITY_SCORE: 26
ADLS_ACUITY_SCORE: 28

## 2024-02-09 NOTE — PROVIDER NOTIFICATION
Pt having pain in PACU Phase 2 post Oxycodone and Tylenol administration. Notified Dr. Mcdaniel of pt pain and requested Toradol. Provider stated he would put order in.

## 2024-02-09 NOTE — DISCHARGE INSTRUCTIONS
You received Toradol which is an Ibuprofen product at 3:40pm. Next dose of Ibuprofen can be taken at 9:40pm.

## 2024-02-09 NOTE — PHARMACY-ADMISSION MEDICATION HISTORY
Pharmacist Admission Medication History    Admission medication history is complete. The information provided in this note is only as accurate as the sources available at the time of the update.    Information Source(s): Patient, Family member, and CareEverywhere/SureScripts via in-person    Pertinent Information:      Changes made to PTA medication list:  Added: Norco  Deleted: None  Changed: None    Allergies reviewed with patient and updates made in EHR: yes    Medication History Completed By: Angelo Vásquez [facility] 2/9/2024 8:21 AM    PTA Med List   Medication Sig Last Dose    amLODIPine (NORVASC) 5 MG tablet Take 1 tablet (5 mg) by mouth daily 2/8/2024 at am    atorvastatin (LIPITOR) 20 MG tablet Take 1 tablet (20 mg) by mouth At Bedtime 2/8/2024 at pm    cetirizine (ZYRTEC) 10 MG tablet Take 1 tablet (10 mg) by mouth daily as needed for allergies (itching.) prn at prn    HYDROcodone-acetaminophen (NORCO) 5-325 MG tablet Take 1 tablet by mouth every 6 hours as needed for severe pain prn at prn    triamcinolone (KENALOG) 0.1 % external cream Apply topically 2 times daily as needed for irritation (rash, itching.) prn at prn    [DISCONTINUED] HYDROcodone-acetaminophen (NORCO) 5-325 MG tablet Take 1 tablet by mouth every 6 hours as needed for severe pain (Patient not taking: Reported on 2/7/2024) Unknown

## 2024-02-09 NOTE — H&P
History and physical from February 7, 2024 was reviewed.  I agree with the assessment and plan.  No changes in medical history.  No updates.  Plan to proceed with operative intervention.    Kenneth Price MD

## 2024-02-09 NOTE — ANESTHESIA PROCEDURE NOTES
Airway       Patient location during procedure: OR  Staff -        CRNA: Angeline Morrison APRN CRNA       Performed By: CRNA  Consent for Airway        Urgency: elective  Indications and Patient Condition       Indications for airway management: kevin-procedural        Final Airway Details       Final airway type: supraglottic airway    Supraglottic Airway Details        Type: LMA       LMA size: 3    Post intubation assessment        Placement verified by: capnometry, equal breath sounds and chest rise        Number of attempts at approach: 1       Number of other approaches attempted: 0       Secured with: tape       Ease of procedure: easy       Dentition: Intact

## 2024-02-09 NOTE — ANESTHESIA PREPROCEDURE EVALUATION
Anesthesia Pre-Procedure Evaluation    Patient: Torri Lowry   MRN: [id number] : 1945        Procedure : Procedure(s):  OPEN REDUCTION INTERNAL FIXATION, FRACTURE, PATELLA          Past Medical History:   Diagnosis Date    Hypertension       Past Surgical History:   Procedure Laterality Date    No previous surgery        No Known Allergies   Social History     Tobacco Use    Smoking status: Never     Passive exposure: Never    Smokeless tobacco: Never   Substance Use Topics    Alcohol use: Not on file      Wt Readings from Last 1 Encounters:   24 44.5 kg (98 lb 1.9 oz)        Anesthesia Evaluation            ROS/MED HX  ENT/Pulmonary:  - neg pulmonary ROS     Neurologic:  - neg neurologic ROS     Cardiovascular:  - neg cardiovascular ROS   (+)  hypertension- -   -  - -                                      METS/Exercise Tolerance: >4 METS    Hematologic:  - neg hematologic  ROS     Musculoskeletal:  - neg musculoskeletal ROS     GI/Hepatic:  - neg GI/hepatic ROS     Renal/Genitourinary:  - neg Renal ROS     Endo:  - neg endo ROS     Psychiatric/Substance Use:  - neg psychiatric ROS     Infectious Disease:  - neg infectious disease ROS     Malignancy:  - neg malignancy ROS     Other:  - neg other ROS          Physical Exam    Airway  airway exam normal           Respiratory Devices and Support         Dental           Cardiovascular   cardiovascular exam normal          Pulmonary   pulmonary exam normal                OUTSIDE LABS:  CBC:   Lab Results   Component Value Date    WBC 5.0 2024    WBC 3.6 (L) 2023    HGB 13.5 2024    HGB 14.7 2023    HCT 38.9 2024    HCT 43.4 2023     2024     2023     BMP:   Lab Results   Component Value Date     2024     2023    POTASSIUM 4.4 2024    POTASSIUM 4.1 2023    CHLORIDE 101 2024    CHLORIDE 104 2023    CO2 27 2024    CO2 29 2023    BUN 19.7  "02/07/2024    BUN 13.7 06/23/2023    CR 0.77 02/07/2024    CR 0.82 06/23/2023     (H) 02/07/2024    GLC 85 06/23/2023     COAGS: No results found for: \"PTT\", \"INR\", \"FIBR\"  POC: No results found for: \"BGM\", \"HCG\", \"HCGS\"  HEPATIC:   Lab Results   Component Value Date    ALBUMIN 4.0 02/07/2024    PROTTOTAL 7.0 02/07/2024    ALT 24 02/07/2024    AST 27 02/07/2024    ALKPHOS 80 02/07/2024    BILITOTAL 0.8 02/07/2024     OTHER:   Lab Results   Component Value Date    NIRU 9.6 02/07/2024    TSH 2.61 06/23/2023       Anesthesia Plan    ASA Status:  2       Anesthesia Type: General.     - Airway: LMA              Consents    Anesthesia Plan(s) and associated risks, benefits, and realistic alternatives discussed. Questions answered and patient/representative(s) expressed understanding.     - Discussed:     - Discussed with:  Patient            Postoperative Care    Pain management: Peripheral nerve block (Single Shot).   PONV prophylaxis: Ondansetron (or other 5HT-3), Dexamethasone or Solumedrol, Background Propofol Infusion     Comments:    Other Comments: Single shot AC block. LMA     Patient history and physical exam reviewed. NPO status appropriate. Focused physical and history performed, pre-op evaluation updated. RBA discussed re: anesthesia and patients questions answered.               Jaspreet Mcdaniel MD    I have reviewed the pertinent notes and labs in the chart from the past 30 days and (re)examined the patient.  Any updates or changes from those notes are reflected in this note.                  "

## 2024-02-09 NOTE — ANESTHESIA POSTPROCEDURE EVALUATION
Patient: Torri Lowry    Procedure: Procedure(s):  OPEN REDUCTION INTERNAL FIXATION, FRACTURE, PATELLA       Anesthesia Type:  General    Note:  Disposition: Outpatient   Postop Pain Control: Uneventful            Sign Out: Well controlled pain   PONV: No   Neuro/Psych: Uneventful            Sign Out: Acceptable/Baseline neuro status   Airway/Respiratory: Uneventful            Sign Out: Acceptable/Baseline resp. status   CV/Hemodynamics: Uneventful            Sign Out: Acceptable CV status; No obvious hypovolemia; No obvious fluid overload   Other NRE: NONE   DID A NON-ROUTINE EVENT OCCUR? No           Last vitals:  Vitals Value Taken Time   /67 02/09/24 1420   Temp 36.4  C (97.5  F) 02/09/24 1420   Pulse 62 02/09/24 1420   Resp 18 02/09/24 1420   SpO2 94 % 02/09/24 1420       Electronically Signed By: Jaspreet Mcdaniel MD  February 9, 2024  2:29 PM

## 2024-02-09 NOTE — ANESTHESIA PROCEDURE NOTES
"Adductor canal Procedure Note    Pre-Procedure   Staff -        Anesthesiologist:  Jaspreet Mcdaniel MD       Performed By: anesthesiologist       Location: pre-op       Procedure Start/Stop Times: 2/9/2024 9:19 AM and 2/9/2024 9:21 AM       Pre-Anesthestic Checklist: patient identified, IV checked, site marked, risks and benefits discussed, informed consent, monitors and equipment checked, pre-op evaluation, at physician/surgeon's request and post-op pain management  Timeout:       Correct Patient: Yes        Correct Procedure: Yes        Correct Site: Yes        Correct Position: Yes        Correct Laterality: Yes        Site Marked: Yes  Procedure Documentation  Procedure: Adductor canal       Laterality: left       Patient Position: supine       Patient Prep/Sterile Barriers: sterile gloves, mask       Skin prep: Chloraprep       Needle Gauge: 20.        Needle Length (Inches): 4        1. Ultrasound was used to identify targeted nerve, plexus, vascular marker, or fascial plane and place a needle adjacent to it in real-time.       2. Ultrasound was used to visualize the spread of anesthetic in close proximity to the above referenced structure.       3. A permanent image is entered into the patient's record.    Assessment/Narrative         The placement was negative for: blood aspirated, painful injection and site bleeding       Paresthesias: No.    Medication(s) Administered   Bupivacaine 0.25% PF (Infiltration) - Infiltration   15 mL - 2/9/2024 9:19:00 AM  Medication Administration Time: 2/9/2024 9:19 AM      FOR [facility] ([facility]) ONLY:   Pain Team Contact information: please page the Pain Team Via [employer]. Search \"Pain\". During daytime hours, please page the attending first. At night please page the resident first.      "

## 2024-02-09 NOTE — BRIEF OP NOTE
[facility] Brief Operative Note    Pre-operative diagnosis: Closed displaced transverse fracture of left patella, initial encounter   Post-operative diagnosis Closed displaced transverse fracture of left patella, initial encounter   Procedure: Procedure(s):  OPEN REDUCTION INTERNAL FIXATION, FRACTURE, PATELLA   Surgeon(s): Surgeon(s) and Role:     * Kenneth Price MD - Primary   Estimated blood loss: 100 mL    Specimens: * No specimens in log *   Findings: None       Kenneth Price MD

## 2024-02-09 NOTE — OP NOTE
Date of Surgery: February 9, 2024    Location: [facility]    Surgeon: Kenneth Price MD     Assistants:     Pre-operative Diagnosis:   1) Left Patella Fracture, closed, transverse, displaced    Post-operative Diagnosis:   1) Left Patella Fracture, closed, transverse, displaced    Procedure:   1) Open Reduction and Internal Fixation of Left Patella Fracture, closed, transverse, displaced     Implants:  Yaneli 4.0mm cannulated, partially threaded screws x2. 18g Wire x2, #2 FiberWire x2.    Anesthesia: General with regional block    Estimated Blood Loss: 100 ml       Complications: None       Specimen: None    Tourniquette Time: 0 min    Condition: Stable to PACU    Procedure Details   Indications for Procedure:  Patient is a 78-year-old female known to my practice with a closed, transverse, displaced left patella fracture.  For full history and physical please see my note from February 1.  In short, the patient slipped and fell onto her left knee on January 23.  She was evaluated in local emergency department and diagnosed with a displaced left patella fracture.  She was subsequently referred to my clinic.  X-rays at her clinic appointment showed a significantly displaced left transverse patella fracture.  I discussed operative and nonoperative options with her and her adult son.  We discussed nonoperative options in the form of continued knee immobilizer and physical therapy.  I discussed with him that given the significant amount of displacement, it is very unlikely that she would go on to a bony union, and therefore would be very unpredictable and unlikely that she would able to actively extend her leg.  This would make ambulation very difficult without a brace or immobilizer.  We discussed operative management in the form of open reduction and internal fixation.  We discussed operative technique of cannulated screw an 18-gauge wire and the potential of needing a patellar mesh  plate.  Discussed risks and benefits of operative intervention including but not limited to bleeding, infection, failure to cure pain, malunion, nonunion, stiffness, posttraumatic arthritis, damage surrounding nerves and blood vessels, postoperative DVT and PE, loss of limb and loss of life.  We discussed the postoperative protocol.  I did opportunity to answer questions.  They wish to move forward with operative intervention.    Procedure Details:  On the day of surgery the patient and her son were met in the preoperative holding area.  The correct limb was confirmed and marked with a permanent skin marker.  Informed consent was again reviewed confirming the correct patient, site, procedure, laterality, and surgeon.  I again discussed the risks and benefits of operative intervention, as well as nonoperative alternatives.  I had the opportunity to answer their questions.  They wish to proceed with operative intervention.  A regional anesthetic block was performed by my anesthesia colleagues.    The patient was brought back to the operative suite, transferred from the hospital bed to the operative table without incident, and secured using a belt.  General anesthesia was induced by my anesthesia colleagues.  A nonsterile left thigh tourniquet was placed.  The left leg was sterilely prepped and draped in the normal fashion.  A final timeout was performed with all in the room in agreement with the correct patient, site, procedure, and surgeon, as well as preoperative antibiotics having been instilled    A 6 cm longitudinal incision was made centered over the patella.  Meticulous hemostasis was achieved throughout.  Sharp dissection was carried down to the fascial layer.  Identified the proximal and distal fracture fragment.  I also identified the deep fascia overlying the quad tendon and patellar tendon.  I created a large soft tissue planes superficial to the fascia while protecting the quad tendon and patellar tendon.   I worked medially and laterally to create soft tissue plane superficial to the retinaculum.  There is noted to be tears in the retinaculum both medially and laterally.    At this time the fracture site was thoroughly irrigated.  There was noted to be over 2 cm of distraction between the fracture fragments.  There was a large proximal and distal fracture fragment and a transverse fracture.  I cleared the fracture site of any intervening soft tissue and cleared the bone ends down to bleeding bone.  There was noted to be adequate amount of bone both proximally and distally.  I attempted reduction there is noted to be bone loss from the central aspect of the patella.  I therefore removed a slight amount of bone from the medial and lateral edge of the proximal fragment to make smooth fracture edges that reduced well together.  Reduction was performed with the knee in full extension with a small bump underneath the foot.  This was held with 2 large point-to-point reduction clamps.  C-arm was brought in.  AP and lateral views confirmed reduction.    At this time I placed 2 K wires in a proximal to distal direction longitudinally across the fracture site.  X-ray was brought in to confirm appropriate placement of these K wires and that they were not intra-articular.  I then measured for the length of our screws using the K wires. I then reamed over the K wires with a cannulated reamer for a 4.0 cannulated partially-threaded screw.  Both screws were placed over the K wire by power and finished on hand.  The lateral screw achieved excellent fixation.  The medial screw achieved moderate fixation.  X-ray was brought in for AP and lateral x-rays.  Fracture reduction was confirmed.  The lateral screw was noted to be long and this was shortened by 2 mm.    At this time 2 18-gauge wires were threaded through the cannulated screws.  These were crossed in a figure-of-eight fashion.  These were twisted together and tightened with a  large needle .  The excess wire ends were cut.  Unfortunately one of the wires was cut and these wires were removed.  This process was then repeated with an additional 2 18-gauge wires.  This achieved good tightening and compression at the fracture site.  AP and lateral x-rays were brought in.  Final x-rays confirmed appropriate reduction of the fracture site with less than 2 mm articular step-off.  Hardware was not penetrating the articular surface.  The knee was brought through range of motion and no fracture gapping was noted at 60 degrees of knee flexion.  Excess wire ends were cut successfully and the ends were tamped down to the bone.    At this time, given her age and osteoporosis, I elected to backup our fixation with #2 FiberWire.  I whipstitched in a [place] style and #2 FiberWire proximally and distally in the quad tendon, ending at the superior pole of the patella.  I then repeated this process at the patellar tendon, ending at the inferior pole of the patella.  A 2.0 mm drill bit was utilized to make a vertical tunnel through the patella on the lateral aspect.  A Hewson suture passer was utilized to pass the lateral limb of the FiberWire from the patellar tendon through the bony tunnel, and out the superior pole of patella.  This was then repeated on the medial aspect of the patella with the medial limb of the FiberWire from the patellar tendon.  Both the medial and lateral pairs were then sequentially tightened and tied.    The incision was thoroughly irrigated.  The retinaculum was closed with #1 Vicryl.  The skin was closed in a layered fashion with 0 Vicryl, 2-0 Vicryl, and 3-0 Monocryl in a horizontal mattress fashion.  Soft dressings were applied.  The drapes were taken down.  2+ dorsalis pedis pulse was noted.  Calf soft compressible.  A knee immobilizer was placed.  The patient was successfully awoken from general anesthesia.  She was transferred from the operative table to hospital bed  without incident.  She returned to PACU in stable condition    All sponge, needle, and instrument counts were correct at the end the case.    Post-Operative Plan:  Patient will be discharged home when stable from PACU.  I prescribed her oral pain medication, antinausea medication, and aspirin to her outpatient pharmacy.  She will take 162 mg aspirin daily for 4 to 6 weeks for DVT prophylaxis she will be weightbearing as tolerated in the left lower extremity in her knee immobilizer and crutches for balance.  She is to remain in her knee immobilizer except for showering and clothing for 3 to 4 weeks.  She is to keep her leg straight at all times for 3 to 4 weeks.  I will see her in the outpatient clinic in 2 to 3 weeks for suture removal and x-ray.  Will discuss advancing her knee range of motion at that time.    Kenneth Price MD    [facility]   Department of Orthopedic Surgery

## 2024-02-20 ENCOUNTER — THERAPY VISIT (OUTPATIENT)
Dept: PHYSICAL THERAPY | Facility: REHABILITATION | Age: 79
End: 2024-02-20
Attending: STUDENT IN AN ORGANIZED HEALTH CARE EDUCATION/TRAINING PROGRAM
Payer: COMMERCIAL

## 2024-02-20 DIAGNOSIS — Z98.890 S/P KNEE SURGERY: ICD-10-CM

## 2024-02-20 DIAGNOSIS — S82.032P: ICD-10-CM

## 2024-02-20 PROCEDURE — 97110 THERAPEUTIC EXERCISES: CPT | Mod: GP

## 2024-02-20 PROCEDURE — 97161 PT EVAL LOW COMPLEX 20 MIN: CPT | Mod: GP

## 2024-02-20 NOTE — PROGRESS NOTES
PHYSICAL THERAPY EVALUATION  Type of Visit: Evaluation    See electronic medical record for Abuse and Falls Screening details.    Gustavo Wild presents to physical therapy with her son (Chris, who helps with history and acts as ) following a patellar ORIF surgery secondary to fracture following a fall on ice and landing directly on her knee. She went to the ER, followed up with primary care, and then was referred to orthopedic surgery who recommended surgery which she had on 2/9/24. She has been walking short distances since the surgery with crutches. Worst pain: 8/10. Best pain: 2/10. Current pain: 6/10. She likes to garden and wants to get back to it as soon as possible.  Presenting condition or subjective complaint: Follow up from the surger leg swollen  Date of onset: 02/09/24 (Date recorded his date of surgery)    Relevant medical history:     Dates & types of surgery:      Prior diagnostic imaging/testing results:       Prior therapy history for the same diagnosis, illness or injury: No Patella surgery on Feb. 9th, 2024    Prior Level of Function  Transfers: Independent  Ambulation: Independent  ADL: Independent  IADL:  Independent    Living Environment  Social support:     Type of home: House   Stairs to enter the home: Yes 2     Ramp: No   Stairs inside the home: Yes 2 Is there a railing: Yes   Help at home: Self Cares (home health aide/personal care attendant, family, etc)  Equipment owned: Crutches     Employment: No    Hobbies/Interests: Eliot    Patient goals for therapy:      Pain assessment:  see subjective report     Objective   KNEE EVALUATION  PAIN: see subjective report  INTEGUMENTARY (edema, incisions):  Not inspected by PT due to being wrapped, but patient denied any warmth, redness, or localized swelling/oozing  POSTURE:   GAIT:  Weightbearing Status: WBAT  Assistive Device(s):   Gait Deviations:   BALANCE/PROPRIOCEPTION:   WEIGHTBEARING ALIGNMENT:   NON-WEIGHTBEARING  "ALIGNMENT:   ROM:   - Deferred due to post-op precautions  STRENGTH:  deferred  FLEXIBILITY:   SPECIAL TESTS:   FUNCTIONAL TESTS:   PALPATION:   JOINT MOBILITY:     Assessment & Plan   CLINICAL IMPRESSIONS  Medical Diagnosis: Closed displaced transverse fracture of left patella with malunion  S/P knee surgery (Physical therapy using the \"Patella ORIF postop protocol\" starting 1 week after surgery)    Treatment Diagnosis: Post-op left knee pain with mobility and strength deficits   Impression/Assessment: Patient is a 78 year old female with post-op left knee pain complaints.  The following significant findings have been identified: Pain, Decreased ROM/flexibility, Decreased joint mobility, Decreased strength, Impaired balance, Impaired gait, Impaired muscle performance, and Decreased activity tolerance. These impairments interfere with their ability to perform self care tasks, recreational activities, household chores, driving , household mobility, and community mobility as compared to previous level of function.  Of note, the patient reports only wanting to be seen twice per month, despite multiple recommendations for more frequent visits and education by therapist regarding purpose and goals of physical therapy.  Therapist also informed patient that healing process might be delayed due to decreased frequency of PT.  Patient and family verbalized understanding, however, still verbalized desire to limit visits to every other week.    Clinical Decision Making (Complexity):  Clinical Presentation: Stable/Uncomplicated  Clinical Presentation Rationale: based on medical and personal factors listed in PT evaluation  Clinical Decision Making (Complexity): Low complexity    PLAN OF CARE  Treatment Interventions:  Modalities: Cryotherapy, E-stim, Hot Pack  Interventions: Gait Training, Manual Therapy, Neuromuscular Re-education, Therapeutic Activity, Therapeutic Exercise, Self-Care/Home Management    Long Term Goals     PT " Goal 1  Goal Identifier: HEP  Goal Description: Torri will demonstrate mastery of ([id number] need for cueing) and compliance with (completion on at least 5/7 days/week) her home exercise program to facilitate increased rate of improvement.  Goal Progress: In progress  Target Date: 03/19/24  PT Goal 2  Goal Identifier: LEFS  Goal Description: Torri will demonstrate significantly improved function as evidenced by an increased LEFS score of at least 40/80.  Goal Progress: 9/80  Target Date: 05/14/24  PT Goal 3  Goal Identifier: Gait  Goal Description: Torri will demonstrate significantly improved tolerance to functional mobility as evidenced by her ability to walk for up to 15 minutes without need for assistive device and self-report pain no more than 2/10.  Goal Progress: Unable  Target Date: 05/14/24  PT Goal 4  Goal Identifier: Gardening  Goal Description: Torri will demonstrate improved tolerance to desired recreational activities as evidenced by her ability to garden for up to 30 minutes without limitation and self-report pain no higher than 2/10.  Goal Progress: Unable  Target Date: 05/14/24      Frequency of Treatment: Up to 1 time per week  Duration of Treatment: 12 weeks    Recommended Referrals to Other Professionals:  none  Education Assessment:   Learner/Method: Patient;Family;Listening;Demonstration;Pictures/Video    Risks and benefits of evaluation/treatment have been explained.   Patient/Family/caregiver agrees with Plan of Care.     Evaluation Time:     PT Eval, Low Complexity Minutes (15957): 20       Signing Clinician: Moshe Cummings, PT      [facility]                                                                                   OUTPATIENT PHYSICAL THERAPY      PLAN OF TREATMENT FOR OUTPATIENT REHABILITATION   Patient's Last Name, First Name, M.I.  Torri Lowry    YOB: 1945   Provider's Name   [facility]   Medical Record  "No.  [id number]     Onset Date: 02/09/24 (Date recorded his date of surgery)  Start of Care Date: 02/20/24     Medical Diagnosis:  Closed displaced transverse fracture of left patella with malunion  S/P knee surgery (Physical therapy using the \"Patella ORIF postop protocol\" starting 1 week after surgery)      PT Treatment Diagnosis:  Post-op left knee pain with mobility and strength deficits Plan of Treatment  Frequency/Duration: Up to 1 time per week/ 12 weeks    Certification date from 02/20/24 to 05/14/24         See note for plan of treatment details and functional goals     Moshe Cummings, PT                         I CERTIFY THE NEED FOR THESE SERVICES FURNISHED UNDER        THIS PLAN OF TREATMENT AND WHILE UNDER MY CARE     (Physician attestation of this document indicates review and certification of the therapy plan).              Referring Provider:  Kenneth Price    Initial Assessment  See Epic Evaluation- Start of Care Date: 02/20/24                "

## 2024-02-22 ENCOUNTER — OFFICE VISIT (OUTPATIENT)
Dept: ORTHOPEDICS | Facility: CLINIC | Age: 79
End: 2024-02-22
Payer: COMMERCIAL

## 2024-02-22 ENCOUNTER — ANCILLARY PROCEDURE (OUTPATIENT)
Dept: GENERAL RADIOLOGY | Facility: CLINIC | Age: 79
End: 2024-02-22
Attending: STUDENT IN AN ORGANIZED HEALTH CARE EDUCATION/TRAINING PROGRAM
Payer: COMMERCIAL

## 2024-02-22 DIAGNOSIS — S82.032P CLOSED DISPLACED TRANSVERSE FRACTURE OF LEFT PATELLA WITH MALUNION, SUBSEQUENT ENCOUNTER: ICD-10-CM

## 2024-02-22 DIAGNOSIS — S82.032P CLOSED DISPLACED TRANSVERSE FRACTURE OF LEFT PATELLA WITH MALUNION, SUBSEQUENT ENCOUNTER: Primary | ICD-10-CM

## 2024-02-22 PROCEDURE — 99024 POSTOP FOLLOW-UP VISIT: CPT | Performed by: STUDENT IN AN ORGANIZED HEALTH CARE EDUCATION/TRAINING PROGRAM

## 2024-02-22 PROCEDURE — 73560 X-RAY EXAM OF KNEE 1 OR 2: CPT | Mod: TC | Performed by: RADIOLOGY

## 2024-02-22 NOTE — PROGRESS NOTES
CC: 2 weeks status post left patella ORIF    HPI: Patient is a 78-year-old female seen here today 2 weeks status post a left patella ORIF.  She is seen here today with her adult son.  Overall her pain is improved.  She is using Tylenol and ibuprofen.  She has started to place some weight through the left leg but has not ambulated yet.  They have done a 1 session of physical therapy.  No drainage from the incision site.  No numbness or tingling in her foot.    Objective:   PE:  LLE: Incision site healing appropriately.  No signs of erythema or drainage.  Mild pain to palpation over the.  No prominent hardware noted.  Moderate soft tissue edema.  Mild ecchymosis around the incision site.  Trace joint effusion.  0 to 30 degrees of passive knee range of motion without pain.  Fires quad weakly.  Unable to perform straight leg raise. no pain with compressive or passive stretch of the calf.  5/5 ankle plantarflexion/dorsiflexion and EHL/FHL.  Sensation intact in superficial peroneal, deep peroneal, and tibial nerve distribution to the foot    Imaging:   AP and lateral x-ray of the left knee from today were reviewed.  This shows screws and wire in appropriate position.  No signs of hardware breakage.  No change in fracture alignment from intraoperative x-rays.  I not appreciate any callus formation at this time    A/P:  Patient is a 78-year-old female seen 2 weeks status post a left patella ORIF.  Overall she is doing well.  Her pain continues to improve.  Incision healing appropriately.  X-rays appear stable from intraoperative x-rays.  Sutures were removed today.  I instructed her and her son on incision care.  I would like her to stay in her knee immobilizer for an additional 2 weeks.  I do have concerns about slower fracture healing given her age, and bone quality.  We will pursue a slower course of physical therapy.  I am going to follow-up with her in 2 weeks for repeat x-rays.  We will decide on starting range of  motion of the knee at that time.    Kenneth Price MD    [facility]   Department of Orthopedic Surgery      Disclaimer: This note consists of symbols derived from keyboarding, dictation and/or voice recognition software. As a result, there may be errors in the script that have gone undetected. Please consider this when interpreting information found in this chart.

## 2024-02-22 NOTE — LETTER
2/22/2024         RE: Torri Lowry  [address]  [place] [address]        Dear Colleague,    Thank you for referring your patient, Torri Lowry, to the [facility]. Please see a copy of my visit note below.    CC: 2 weeks status post left patella ORIF    HPI: Patient is a 78-year-old female seen here today 2 weeks status post a left patella ORIF.  She is seen here today with her adult son.  Overall her pain is improved.  She is using Tylenol and ibuprofen.  She has started to place some weight through the left leg but has not ambulated yet.  They have done a 1 session of physical therapy.  No drainage from the incision site.  No numbness or tingling in her foot.    Objective:   PE:  LLE: Incision site healing appropriately.  No signs of erythema or drainage.  Mild pain to palpation over the.  No prominent hardware noted.  Moderate soft tissue edema.  Mild ecchymosis around the incision site.  Trace joint effusion.  0 to 30 degrees of passive knee range of motion without pain.  Fires quad weakly.  Unable to perform straight leg raise. no pain with compressive or passive stretch of the calf.  5/5 ankle plantarflexion/dorsiflexion and EHL/FHL.  Sensation intact in superficial peroneal, deep peroneal, and tibial nerve distribution to the foot    Imaging:   AP and lateral x-ray of the left knee from today were reviewed.  This shows screws and wire in appropriate position.  No signs of hardware breakage.  No change in fracture alignment from intraoperative x-rays.  I not appreciate any callus formation at this time    A/P:  Patient is a 78-year-old female seen 2 weeks status post a left patella ORIF.  Overall she is doing well.  Her pain continues to improve.  Incision healing appropriately.  X-rays appear stable from intraoperative x-rays.  Sutures were removed today.  I instructed her and her son on incision care.  I would like her to stay in her knee immobilizer for an additional 2  weeks.  I do have concerns about slower fracture healing given her age, and bone quality.  We will pursue a slower course of physical therapy.  I am going to follow-up with her in 2 weeks for repeat x-rays.  We will decide on starting range of motion of the knee at that time.    Kenneth Price MD    [facility]   Department of Orthopedic Surgery      Disclaimer: This note consists of symbols derived from keyboarding, dictation and/or voice recognition software. As a result, there may be errors in the script that have gone undetected. Please consider this when interpreting information found in this chart.        Again, thank you for allowing me to participate in the care of your patient.        Sincerely,        Kenneth Price MD

## 2024-02-27 ENCOUNTER — THERAPY VISIT (OUTPATIENT)
Dept: PHYSICAL THERAPY | Facility: REHABILITATION | Age: 79
End: 2024-02-27
Attending: STUDENT IN AN ORGANIZED HEALTH CARE EDUCATION/TRAINING PROGRAM
Payer: COMMERCIAL

## 2024-02-27 DIAGNOSIS — S82.032P: Primary | ICD-10-CM

## 2024-02-27 DIAGNOSIS — Z98.890 S/P KNEE SURGERY: ICD-10-CM

## 2024-02-27 PROCEDURE — 97110 THERAPEUTIC EXERCISES: CPT | Mod: GP | Performed by: PHYSICAL THERAPIST

## 2024-02-27 PROCEDURE — 97116 GAIT TRAINING THERAPY: CPT | Mod: GP | Performed by: PHYSICAL THERAPIST

## 2024-02-28 ENCOUNTER — PATIENT OUTREACH (OUTPATIENT)
Dept: GERIATRIC MEDICINE | Facility: CLINIC | Age: 79
End: 2024-02-28
Payer: COMMERCIAL

## 2024-02-28 NOTE — PROGRESS NOTES
[facility] Care Coordination Contact      [facility] Six-Month Telephone Assessment    6 month telephone assessment completed on 2/25/24.    ER visits: Yes -   out of state ED visit  Hospitalizations: No  TCU stays: No  Significant health status changes: Had knee surgery early this month. Reports taking time to move around.   Falls/Injuries: Yes: She reported fell on a sidewalk and fractured left knee.  ADL/IADL changes: Yes: needs help with bathing and mobility. Family is assisting informally.  Changes in services: No formal services. Member requested to attend adult day care.     Caregiver Assessment follow up:  NA    Goals: See POC in chart for goal progress documentation.      Home visit scheduled for 3/12/24 at 1 pm.    Sidney Fonseca RN, PHN   [facility]  [phone]

## 2024-03-05 ENCOUNTER — TELEPHONE (OUTPATIENT)
Dept: ORTHOPEDICS | Facility: CLINIC | Age: 79
End: 2024-03-05

## 2024-03-05 NOTE — TELEPHONE ENCOUNTER
Called patient through interp services and there was no answer. Interp services left a message telling the patient the apt for 3/7 is canceled due to the provider out of clinic. They should call back to reschedule.

## 2024-03-11 ENCOUNTER — OFFICE VISIT (OUTPATIENT)
Dept: FAMILY MEDICINE | Facility: CLINIC | Age: 79
End: 2024-03-11
Payer: COMMERCIAL

## 2024-03-11 VITALS
RESPIRATION RATE: 18 BRPM | SYSTOLIC BLOOD PRESSURE: 128 MMHG | TEMPERATURE: 99 F | BODY MASS INDEX: 21.36 KG/M2 | OXYGEN SATURATION: 99 % | HEIGHT: 57 IN | WEIGHT: 99 LBS | HEART RATE: 62 BPM | DIASTOLIC BLOOD PRESSURE: 62 MMHG

## 2024-03-11 DIAGNOSIS — Z98.890 S/P KNEE SURGERY: ICD-10-CM

## 2024-03-11 DIAGNOSIS — M62.81 GENERALIZED MUSCLE WEAKNESS: ICD-10-CM

## 2024-03-11 DIAGNOSIS — E78.2 MIXED HYPERLIPIDEMIA: ICD-10-CM

## 2024-03-11 DIAGNOSIS — I10 HYPERTENSION, UNSPECIFIED TYPE: ICD-10-CM

## 2024-03-11 DIAGNOSIS — S82.032P: ICD-10-CM

## 2024-03-11 DIAGNOSIS — Z09 HOSPITAL DISCHARGE FOLLOW-UP: Primary | ICD-10-CM

## 2024-03-11 PROCEDURE — 99214 OFFICE O/P EST MOD 30 MIN: CPT | Performed by: FAMILY MEDICINE

## 2024-03-11 RX ORDER — AMLODIPINE BESYLATE 5 MG/1
5 TABLET ORAL DAILY
Qty: 90 TABLET | Refills: 3 | Status: SHIPPED | OUTPATIENT
Start: 2024-03-11 | End: 2024-08-14

## 2024-03-11 RX ORDER — ATORVASTATIN CALCIUM 40 MG/1
40 TABLET, FILM COATED ORAL AT BEDTIME
Qty: 90 TABLET | Refills: 3 | Status: SHIPPED | OUTPATIENT
Start: 2024-03-11

## 2024-03-11 NOTE — PROGRESS NOTES
Hospital Follow-up Visit:    Hospital/Nursing Home/IP Rehab Facility: [facility]  Date of Admission: Leg surgery  Date of Discharge: 02/09/2024  Reason(s) for Admission: 02/09/2024    Was your hospitalization related to COVID-19? No   Problems taking medications regularly:  None  Medication changes since discharge: None  Problems adhering to non-medication therapy:  None    Summary of hospitalization:  [facility] discharge summary reviewed  Diagnostic Tests/Treatments reviewed.  Follow up needed: none  Other Healthcare Providers Involved in Patient s Care:         None  Update since discharge: stable.       H&P reviewed from 02/09/2024.  Patient did have an open reduction internal fixation fracture.  No complications afterwards.        Since then, patient has been doing well overall.  The pain is better controlled now compared to before.  If she does certain movements, it does cause some pain.  She does keep her leg in the brace for the most part.  She has been getting better and going to the bathroom with the left leg brace on.  Toes and feet are actually normal.  Sensations and movements are normal.  Overall, she is feeling better.  Eating drinking well.    They are requesting for wheelchair as patient continues to have difficulties with mobility.  They have been working with physical therapy.  Reviewed recommendations to work with physical therapy as recommended by the surgery team and the physical therapist team.  Patient expresses understanding and will do this.  Discussed anticipated course of recovery.    HTN: doing well now. It was high right after surgery.  Blood pressure is 128/62 today.  She continues to take the amlodipine 5 mg once daily.      HLD: higher when checked in 2/7/2024. She eats well, though the cholesterol is still high.     The 10-year ASCVD risk score (Aurelio KHAN, et al., 2019) is: 25.5%    Values used to calculate the score:      Age: 78 years     "  Sex: Female      Is Non- : No      Diabetic: No      Tobacco smoker: No      Systolic Blood Pressure: 128 mmHg      Is BP treated: Yes      HDL Cholesterol: 49 mg/dL      Total Cholesterol: 241 mg/dL        Plan of care communicated with patient and family           Objective:   /62   Pulse 62   Temp 99  F (37.2  C) (Oral)   Resp 18   Ht 1.448 m (4' 9\")   Wt 44.9 kg (99 lb)   SpO2 99%   BMI 21.42 kg/m      General: Active, alert, nontoxic-appearing.  No acute distress.  HEENT: Normocephalic, atraumatic.  Pupils are equal and round.  Sclera is clear.  Nares patent.  Moist mucous membranes.  Normal external ears.  Cardiac: Warm extremities.  Respiratory/chest: Speaking in full sentences.  Nonlabored breathing breathing is not labored.  No accessory muscle usage.  Extremities: Left lower extremity: Left leg is in a brace.  Able to move toes freely.  Sensation to light touch intact in all dermatomes.  Pedal and posterior tibial pulses are +2/+4.  No feet/leg swelling noted.  Right lower extremity: Voluntary movements intact.  Integumentary: No concerning rash or skin changes appreciated.      Assessment/Plan:     Patient Instructions:    -Take medications as prescribed.  -Follow recommendations as given to you from the knee surgery team.  -Do physical therapy exercises as recommended from the physical therapist.  -It is important to do the physical therapy exercises as recommended as this will help you recover better.    -Take the prescription for the wheelchair to a medical supply company.  There are many medical supply companies in the area.  Search for these locations online.  -Plan to limit your usage of the wheelchair so as to help you stay stronger for longer.    Please seek immediate medical attention (go to the emergency room or urgent care) for the following reasons: worsening symptoms, redness, swelling, more pain, numbness, tingling, or any concerning changes.    Torri " was seen today for hospital f/u.  Diagnoses and all orders for this visit:    Hospital discharge follow-up  Closed displaced transverse fracture of left patella with malunion  S/P knee surgery: Doing well after surgery.  Continue plan.  Patient would like a multivitamin, so prescription sent as below.  -     Primary Care - Care Coordination Referral; Future  -     Wheelchair Order for DME - ONLY FOR DME  -     Multiple Vitamins-Minerals (MULTIVITAMIN WOMEN 50+) TABS; Take 1 chew tab by mouth daily    Hypertension, unspecified type: Stable.  Continue on medication as prescribed.  -     Primary Care - Care Coordination Referral; Future  -     Wheelchair Order for DME - ONLY FOR DME  -     amLODIPine (NORVASC) 5 MG tablet; Take 1 tablet (5 mg) by mouth daily  -     Multiple Vitamins-Minerals (MULTIVITAMIN WOMEN 50+) TABS; Take 1 chew tab by mouth daily    Mixed hyperlipidemia: Stable.  Continue on medication as prescribed.  -     Primary Care - Care Coordination Referral; Future  -     Wheelchair Order for DME - ONLY FOR DME  -     atorvastatin (LIPITOR) 40 MG tablet; Take 1 tablet (40 mg) by mouth at bedtime  -     Multiple Vitamins-Minerals (MULTIVITAMIN WOMEN 50+) TABS; Take 1 chew tab by mouth daily      Chris Fonseca MD  [facility] [place] [address]  Phone: [phone]  Fax: [phone]    3/13/2024  8:53 AM

## 2024-03-11 NOTE — PATIENT INSTRUCTIONS
-Thank you for choosing the [facility].  -It was a pleasure to see you today.  -Please take a look at the information below for more specific details regarding the treatment plan and recommendations.  -In this after visit summary is a list of your medications and specific instructions.  Please review this carefully as there may be changes made to your medication list.  -If there are any particular questions or concerns, please feel free to reach out to Dr. Fonseca.  -If any labs have been completed, we will reach out to you about results.  If the results are normal or not concerning, a letter or ecoATMhart message will be sent to you.  If any follow-up is needed, either Dr. Fonseca or the nurse will give you a call.  If you have not heard regarding results after 2 weeks, please reach out to the clinic.    Patient Instructions:    -Take medications as prescribed.  -Follow recommendations as given to you from the knee surgery team.  -Do physical therapy exercises as recommended from the physical therapist.  -It is important to do the physical therapy exercises as recommended as this will help you recover better.    -Take the prescription for the wheelchair to a medical supply company.  There are many medical supply companies in the area.  Search for these locations online.  -Plan to limit your usage of the wheelchair so as to help you stay stronger for longer.    Please seek immediate medical attention (go to the emergency room or urgent care) for the following reasons: worsening symptoms, redness, swelling, more pain, numbness, tingling, or any concerning changes.      --------------------------------------------------------------------------------------------------------------------    -We are always looking for ways to improve.  You may be selected to receive a survey regarding your visit today.  We encourage you to complete the survey and provide specific, constructive feedback to help us improve our  processes.  Thank you for your time!  -Please review the contact information listed on the after visit summary and in the electronic chart.  Below is the phone number that we have on file.  If there are any changes that are needed to be made, please reach out to the clinic.  [phone] (home)

## 2024-03-11 NOTE — LETTER
March 11, 2024      Torri Lowry  [address] [address]        To Whom It May Concern,     Ms. Wild has been seen at our clinic.  Patient underwent an open reduction and internal fixation for repair of a left patellar fracture.  This has significantly impedes her ability to complete activities of daily living.  Patient would benefit from PCA services.  Please assess and provide PCA recommendations.    Patient Active Problem List    Diagnosis Date Noted    Generalized muscle weakness 03/11/2024     Priority: Medium    Closed displaced transverse fracture of left patella with malunion 02/20/2024     Priority: Medium    S/P knee surgery 02/20/2024     Priority: Medium    Hypertension, unspecified type 06/23/2023     Priority: Medium    Mixed hyperlipidemia 06/23/2023     Priority: Medium         Sincerely,          Chris Fonseca MD  [facility] [place] [address]  Phone: [phone]  Fax: [phone]    3/11/2024  1:34 PM

## 2024-03-12 ENCOUNTER — PATIENT OUTREACH (OUTPATIENT)
Dept: GERIATRIC MEDICINE | Facility: CLINIC | Age: 79
End: 2024-03-12
Payer: COMMERCIAL

## 2024-03-12 NOTE — PROGRESS NOTES
[facility] Care Coordination Contact    HRA rescheduled for 3/14/24.    Sidney Fonseca RN, PHN   [facility]  [phone]

## 2024-03-14 ENCOUNTER — TELEPHONE (OUTPATIENT)
Dept: GERIATRIC MEDICINE | Facility: CLINIC | Age: 79
End: 2024-03-14

## 2024-03-14 ENCOUNTER — ANCILLARY PROCEDURE (OUTPATIENT)
Dept: GENERAL RADIOLOGY | Facility: CLINIC | Age: 79
End: 2024-03-14
Attending: STUDENT IN AN ORGANIZED HEALTH CARE EDUCATION/TRAINING PROGRAM
Payer: COMMERCIAL

## 2024-03-14 ENCOUNTER — PATIENT OUTREACH (OUTPATIENT)
Dept: GERIATRIC MEDICINE | Facility: CLINIC | Age: 79
End: 2024-03-14

## 2024-03-14 ENCOUNTER — OFFICE VISIT (OUTPATIENT)
Dept: ORTHOPEDICS | Facility: CLINIC | Age: 79
End: 2024-03-14
Payer: COMMERCIAL

## 2024-03-14 DIAGNOSIS — M62.81 GENERALIZED MUSCLE WEAKNESS: ICD-10-CM

## 2024-03-14 DIAGNOSIS — S82.032P CLOSED DISPLACED TRANSVERSE FRACTURE OF LEFT PATELLA WITH MALUNION, SUBSEQUENT ENCOUNTER: Primary | ICD-10-CM

## 2024-03-14 DIAGNOSIS — Z98.890 S/P KNEE SURGERY: ICD-10-CM

## 2024-03-14 DIAGNOSIS — S82.032P CLOSED DISPLACED TRANSVERSE FRACTURE OF LEFT PATELLA WITH MALUNION, SUBSEQUENT ENCOUNTER: ICD-10-CM

## 2024-03-14 DIAGNOSIS — S82.032P: Primary | ICD-10-CM

## 2024-03-14 PROCEDURE — 73560 X-RAY EXAM OF KNEE 1 OR 2: CPT | Mod: TC | Performed by: RADIOLOGY

## 2024-03-14 PROCEDURE — 99024 POSTOP FOLLOW-UP VISIT: CPT | Performed by: STUDENT IN AN ORGANIZED HEALTH CARE EDUCATION/TRAINING PROGRAM

## 2024-03-14 ASSESSMENT — ACTIVITIES OF DAILY LIVING (ADL): DEPENDENT_IADLS:: CLEANING;COOKING;LAUNDRY;SHOPPING;MEAL PREPARATION;MONEY MANAGEMENT;TRANSPORTATION

## 2024-03-14 NOTE — LETTER
3/14/2024         RE: Torri Lowry  [address]  [place] [address]        Dear Colleague,    Thank you for referring your patient, Torri Lowry, to the [facility]. Please see a copy of my visit note below.    CC: 5 weeks status post left patella ORIF    HPI: Patient is a 78-year-old female presents here today with her adult daughter in follow-up 5 weeks status post left patella ORIF.  She is still using her knee immobilizer.  Per the daughter she has begun to ambulate in the immobilizer.  She is using crutches for balance and comfort.  Her pain is improving.  She now notes pain over the patellar tendon and quad tendon when attempting to bend her knee.  She has started gentle range of motion.  Her swelling is improving.  She has not yet started formal physical therapy    Objective:   PE:  LLE: Well-healed surgical incision over the anterior aspect of the left knee.  No signs of erythema or drainage.  Mild soft tissue edema.  Passive range of motion 0 to 60 degrees of knee flexion limited by pain.  Is able to perform straight leg raise.  Mild pain to palpation over the quad tendon and patellar tendon    Imagin view x-ray of the left patella from today was reviewed.  This shows well-maintained alignment of the fracture.  I am starting to see some loss of fracture line visibility on AP and lateral views as comparison to prior views.  No hardware breakage or loosening.  Trace intra-articular effusion    A/P:  Patient is a 78-year-old female seen here today with her adult daughter 5 weeks status post a left patella ORIF.  X-rays show stable fracture alignment and hardware with signs of early callus formation.  On exam she is able to perform straight leg raise.  Passive range of motion to 60 degrees knee flexion.  I reviewed her images today with her and her daughter.  At this time she is cleared to weight-bear in her knee immobilizer.  She can start gentle range of motion when  not weightbearing.  I would like her to start formal physical therapy to work on range of motion.  At this time I would set her limit at 90 degrees of knee flexion.  I am to follow-up with her in 3 weeks.  At that time I will likely DC her from her knee immobilizer and allow her unrestricted range of motion of the knee.    Kenneth Price MD    [facility]   Department of Orthopedic Surgery      Disclaimer: This note consists of symbols derived from keyboarding, dictation and/or voice recognition software. As a result, there may be errors in the script that have gone undetected. Please consider this when interpreting information found in this chart.        Again, thank you for allowing me to participate in the care of your patient.        Sincerely,        Kenneth Price MD

## 2024-03-14 NOTE — PROGRESS NOTES
CC: 5 weeks status post left patella ORIF    HPI: Patient is a 78-year-old female presents here today with her adult daughter in follow-up 5 weeks status post left patella ORIF.  She is still using her knee immobilizer.  Per the daughter she has begun to ambulate in the immobilizer.  She is using crutches for balance and comfort.  Her pain is improving.  She now notes pain over the patellar tendon and quad tendon when attempting to bend her knee.  She has started gentle range of motion.  Her swelling is improving.  She has not yet started formal physical therapy    Objective:   PE:  LLE: Well-healed surgical incision over the anterior aspect of the left knee.  No signs of erythema or drainage.  Mild soft tissue edema.  Passive range of motion 0 to 60 degrees of knee flexion limited by pain.  Is able to perform straight leg raise.  Mild pain to palpation over the quad tendon and patellar tendon    Imagin view x-ray of the left patella from today was reviewed.  This shows well-maintained alignment of the fracture.  I am starting to see some loss of fracture line visibility on AP and lateral views as comparison to prior views.  No hardware breakage or loosening.  Trace intra-articular effusion    A/P:  Patient is a 78-year-old female seen here today with her adult daughter 5 weeks status post a left patella ORIF.  X-rays show stable fracture alignment and hardware with signs of early callus formation.  On exam she is able to perform straight leg raise.  Passive range of motion to 60 degrees knee flexion.  I reviewed her images today with her and her daughter.  At this time she is cleared to weight-bear in her knee immobilizer.  She can start gentle range of motion when not weightbearing.  I would like her to start formal physical therapy to work on range of motion.  At this time I would set her limit at 90 degrees of knee flexion.  I am to follow-up with her in 3 weeks.  At that time I will likely DC her from her  knee immobilizer and allow her unrestricted range of motion of the knee.    Kenneth Price MD    [facility]   Department of Orthopedic Surgery      Disclaimer: This note consists of symbols derived from keyboarding, dictation and/or voice recognition software. As a result, there may be errors in the script that have gone undetected. Please consider this when interpreting information found in this chart.

## 2024-03-15 NOTE — PROGRESS NOTES
[facility] Care Coordination Contact    [facility] Early Reassessment     Home visit for Change in Condition Health Risk Assessment with Torri Lowry completed on March 14, 2024    Reason for Early reassessment: Request for Elderly Waiver services Yes, if yes explain would like to attend adult day care and in need for homemaking services.    Type of residence:: Private home - stairs  Current living arrangement:: I live in a private home with family     Assessment completed with:: Patient, Care Team Member, Children    Current Care Plan  Member currently receiving the following home care services:     Member currently receiving the following community resources: County Worker      Medication Review  Medication reconciliation completed in Epic: Yes  Medication set-up & administration: Independent-does not set up.  Self-administers medications.  Medication Risk Assessment Medication (1 or more, place referral to MTM): N/A: No risk factors identified  MTM Referral Placed: No: No risk factors idenified    Mental/Behavioral Health   Depression Screening:   PHQ-2 Total Score (Adult) - Positive if 3 or more points; Administer PHQ-9 if positive: 0        Mental health DX:: No        Falls Assessment:   Fallen 2 or more times in the past year?: No   Any fall with injury in the past year?: Yes    ADL/IADL Dependencies:   Dependent ADLs:: Bathing, Ambulation-walker  Dependent IADLs:: Cleaning, Cooking, Laundry, Shopping, Meal Preparation, Money Management, Transportation    Health Plan sponsored benefits: [facility]: Shared information regarding [employer]. Reviewed preventative health screening and health plan supplemental benefits/incentives. Reviewed medication disposal form.    PCA Assessment completed at visit: Yes Initial PCA Assessment indicated .5 hours per day of PCA.      Elderly Waiver Eligibility: Yes-will open to EW    Care Plan & Recommendations: Torri would like to continue to reside in  the community with formal services. She would like to attend adult day care for exercise and social activities for emotional support. Worries a lot about oldest daughter. Daughter in her 50's had a stroke.     PCA to assist with bathing and homemaking services for IADLs.    Needs a shower chair with back support. Will send telephone encounter to PCP for prescription.     DHS 8338 completed and faxed to [facility]. Will authorize waiver services upon elderly waiver approval.     See [facility] for detailed assessment information.    Follow-Up Plan: Member informed of future contact, plan to f/u with member with a 6 month telephone assessment.  Contact information shared with member and family, encouraged member to call with any questions or concerns at any time.    [facility] care continuum providers: Please see Snapshot and Care Management Flowsheets for Specific details of care plan.    This CC note routed to PCP, Nadege Gonsalez.     Sidney Fonseca RN, PHN   [facility]  [phone]

## 2024-03-18 ENCOUNTER — MEDICAL CORRESPONDENCE (OUTPATIENT)
Dept: HEALTH INFORMATION MANAGEMENT | Facility: CLINIC | Age: 79
End: 2024-03-18
Payer: COMMERCIAL

## 2024-03-21 ENCOUNTER — PATIENT OUTREACH (OUTPATIENT)
Dept: GERIATRIC MEDICINE | Facility: CLINIC | Age: 79
End: 2024-03-21
Payer: COMMERCIAL

## 2024-03-21 NOTE — PROGRESS NOTES
[facility] Care Coordination Contact    Prescription for shower chair emailed to [facility].    Sidney Fonseca RN, PHN   [facility]  [phone]

## 2024-03-22 ENCOUNTER — PATIENT OUTREACH (OUTPATIENT)
Dept: GERIATRIC MEDICINE | Facility: CLINIC | Age: 79
End: 2024-03-22
Payer: COMMERCIAL

## 2024-03-22 ENCOUNTER — TELEPHONE (OUTPATIENT)
Dept: FAMILY MEDICINE | Facility: CLINIC | Age: 79
End: 2024-03-22
Payer: COMMERCIAL

## 2024-03-22 DIAGNOSIS — S82.032P: ICD-10-CM

## 2024-03-22 DIAGNOSIS — Z09 HOSPITAL DISCHARGE FOLLOW-UP: Primary | ICD-10-CM

## 2024-03-22 DIAGNOSIS — Z98.890 S/P KNEE SURGERY: ICD-10-CM

## 2024-03-22 DIAGNOSIS — M62.81 GENERALIZED MUSCLE WEAKNESS: ICD-10-CM

## 2024-03-22 NOTE — TELEPHONE ENCOUNTER
Forms/Letter Request    Type of form/letter: OTHER: Bath Seat w/Back & Arms 275LB CAP       Do we have the form/letter: Yes: JOCELYNE    Who is the form from? [employer] (if other please explain)    Where did/will the form come from? form was faxed in    When is form/letter needed by: asap    How would you like the form/letter returned: Fax : [phone]

## 2024-03-22 NOTE — LETTER
[facility]  [address]  [place] [address]  Phone:  [phone]  Fax:  [phone]      March 22, 2024    PRESLEY DORMAN  [address]  [place] [address]    Dear Presley,    Enclosed is a copy of your completed PCA Assessment and Service Plan.  This is for your records and no action is required by you.  If you have additional questions regarding your assessment please contact me at [phone]. If you feel that your needs are not being met, please contact the Clinical Supervisor at [phone].    Sincerely,    Sidney Fonseca RN, PHN  [phone]  [email or web address]            Enclosure:  Completed PCA assessment

## 2024-03-22 NOTE — PROGRESS NOTES
[facility] Care Coordination Contact    [facility]:  Emailed required PCA documents to [facility].  Faxed copy of PCA assessment to PCA Agency and mailed copy to member.  Faxed MD Communication to PCP.     Ana Paula Dillon  Care Management Specialist  [facility]  [phone]

## 2024-03-26 ENCOUNTER — MEDICAL CORRESPONDENCE (OUTPATIENT)
Dept: HEALTH INFORMATION MANAGEMENT | Facility: CLINIC | Age: 79
End: 2024-03-26
Payer: COMMERCIAL

## 2024-03-27 NOTE — TELEPHONE ENCOUNTER
Fax reviewed, completed, and signed. Returned via fax. Task complete.     Order copied to EHR scanning.

## 2024-03-28 ENCOUNTER — TELEPHONE (OUTPATIENT)
Dept: FAMILY MEDICINE | Facility: CLINIC | Age: 79
End: 2024-03-28
Payer: COMMERCIAL

## 2024-03-28 NOTE — TELEPHONE ENCOUNTER
Forms/Letter Request    Type of form/letter: Adult - Health Care Summary form      Do we have the form/letter: Yes: JE    Who is the form from? [facility] (if other please explain)    Where did/will the form come from? form was faxed in    When is form/letter needed by: asap    How would you like the form/letter returned: Fax : [phone]

## 2024-04-03 ENCOUNTER — THERAPY VISIT (OUTPATIENT)
Dept: PHYSICAL THERAPY | Facility: REHABILITATION | Age: 79
End: 2024-04-03
Payer: COMMERCIAL

## 2024-04-03 DIAGNOSIS — S82.032P CLOSED DISPLACED TRANSVERSE FRACTURE OF LEFT PATELLA WITH MALUNION, SUBSEQUENT ENCOUNTER: ICD-10-CM

## 2024-04-03 DIAGNOSIS — Z98.890 S/P KNEE SURGERY: ICD-10-CM

## 2024-04-03 DIAGNOSIS — S82.032P: Primary | ICD-10-CM

## 2024-04-03 PROCEDURE — 97110 THERAPEUTIC EXERCISES: CPT | Mod: GP

## 2024-04-03 NOTE — PROGRESS NOTES
"    PLAN  Discharge in 30 days pending HEP trial.    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 WBAT with immobilizer locked in extension and crutches for balance. No knee flexion on her own for 3-4 weeks.   Other pertinent information Presents to PT with son (Jackie)   Progress Note/Certification   Start of Care Date 02/20/24   Onset of illness/injury or Date of Surgery 02/09/24  (Date recorded his date of surgery)   Therapy Frequency Up to 1 time per week   Predicted Duration 12 weeks   Certification date from 02/20/24   Certification date to 05/14/24   Progress Note Due Date 03/19/24       Present No  (Son acts as  (medical record has \"no\" marked under need for ))   PT Goal 1   Goal Identifier HEP   Goal Description Torri will demonstrate mastery of ([id number] need for cueing) and compliance with (completion on at least 5/7 days/week) her home exercise program to facilitate increased rate of improvement.   Goal Progress Daily; still requires some cueing   Target Date 03/19/24   PT Goal 2   Goal Identifier LEFS   Goal Description Torri will demonstrate significantly improved function as evidenced by an increased LEFS score of at least 40/80.   Goal Progress 9/80   Target Date 05/14/24   PT Goal 3   Goal Identifier Gait   Goal Description Torri will demonstrate significantly improved tolerance to functional mobility as evidenced by her " ability to walk for up to 15 minutes without need for assistive device and self-report pain no more than 2/10.   Goal Progress Sitll uses contralateral axillary crutch; pain up to 2-3/10; 5-7 minutes   Target Date 05/14/24   PT Goal 4   Goal Identifier Gardening   Goal Description Torri will demonstrate improved tolerance to desired recreational activities as evidenced by her ability to garden for up to 30 minutes without limitation and self-report pain no higher than 2/10.   Goal Progress Not yet attempted   Target Date 05/14/24   Subjective Report   Subjective Report Torri reports that she is doing much better, and she feels much more confident and mobile after being discharged from her knee immobilizer brace. She reports wanting to discharge from PT at this time due to feeling more independent and due to difficulties with scheduling/transportation.   Objective Measures   Objective Measures Objective Measure 1;Objective Measure 2   Objective Measure 1   Objective Measure Mobility   Details Presents to PT walking with contralateral crutch today; decreased deonte, decreased knee flexion and extension on left   Objective Measure 2   Objective Measure Observation   Details No immobilizer   Therapeutic Procedure/Exercise   Therapeutic Procedures: strength, endurance, ROM, flexibility minutes (20967) 27   Ther Proc 1 Subjective report + discussion regarding progress and plan of care   Ther Proc 1 - Details See subjective report. Patient reports wanting to discharge at this time as outlined in assessment.   Ther Proc 2 Seated knee extension   Ther Proc 2 - Details 2 x 5-10 bilateral (cueing to avoid hip flexion)   Ther Proc 3 Supine heel slides   Ther Proc 3 - Details 2 x 10-12 (cueing to push into stretch but not pain; approximately 70-80 degrees)   Ther Proc 4 Straight leg raise   Ther Proc 4 - Details 2 x 10 (very mild pain; extensor lag noted; cueing to maintain knee extension)   Ther Proc 5 Sit-to-stand   Ther Proc 5  - Details 2 x 8   Ther Proc 6 Discharge planning and discussion regarding HEP   Skilled Intervention Exercises to increase hip and leg strength and improve weightbearing tolerance   Patient Response/Progress Torri and son verbalized understanding. Torri tolerated all exercises and progressions well with only mild discomfort.   Gait Training   Gait 1 Gait with axillary crutches, WBAT, and knee immobilizer.   Gait 1 - Details Torri's crutches are too tall despite being adjusted to the lowest setting. Patient uses the crutches out in front of her body and does not transition over the crutches. When asked about the crutches she indicates that the first ones she tried were to small. She feels comfortable with these. Discussed possibility of trial of walker at next visit.  Cues for posture and crutch placement   Education   Learner/Method Patient;Family;Listening;Demonstration;Pictures/Video   Plan   Home program See PTRx plus standing weight shifts.   Updates to plan of care Discharg pending 30-day trial of HEP.   Comments   Comments Impression/Assessment: Torri returns to physical therapy after a month away due to scheduling/transportation difficulties. She presents with use of contralateral axillary crutch and without knee immobilizer brace (most recent surgeon's note said that she would be likely be discharged from use of brace around this time, but writer is unable to find another visit with official brace discharge instructions). She continues to demonstrate decreased deonte, widened base of support, and decreased knee flexion/extension through the gait cycle, but she demonstrates safe and modified gait with use of crutch. She reports wanting to discharge from physical therapy at this time due to feeling independent and due to scheduling/transportation difficulties. She reported wanting to know how to improve her knee mobility now that she has stopped using her brace, so the majority of the session was spent teaching  progressions of her HEP lower extremity mobility and strength exercises, all of which she tolerated well, noting only mild discomfort. She has not yet attempted gardening due to cold weather, but she is now able to walk up to 5-7 minutes with pain no higher than 2-3/10. She also reports daily completion of her home exercises, though she still benefits from cueing for proper form. Therapist told patient that he would leave her chart open for 30 days in case she decides to come back in, but if writer hasn't heard from her in that time, she will be officially discharged.   Total Session Time   Timed Code Treatment Minutes 27   Total Treatment Time (sum of timed and untimed services) 27

## 2024-04-12 ENCOUNTER — PATIENT OUTREACH (OUTPATIENT)
Dept: GERIATRIC MEDICINE | Facility: CLINIC | Age: 79
End: 2024-04-12
Payer: COMMERCIAL

## 2024-04-12 NOTE — PROGRESS NOTES
[facility] Care Coordination Contact    Telephone call to member, informed of the approved adult day care and homemaking services.     Shared approved services with daughter-in-law via email at [email or web address].    Emailed sent to Salazar at [facility] adult day care.    Sidney Fonseca RN, PHN   [facility]  [phone]

## 2024-04-12 NOTE — PROGRESS NOTES
[facility] Care Coordination Contact    Received after visit chart from care coordinator.  Completed following tasks: Mailed copy of care plan/support plan to member, Mailed Safe Medication Disposal , Submitted referrals/auths for ADC with Transportation , and Updated services in Database    Ana Paula Dillon  Care Management Specialist  [facility]  [phone]

## 2024-04-12 NOTE — LETTER
April 12, 2024      PRESLEY DORMAN  [address] [place] [address]      Dear Presley:    At [facility], we re dedicated to improving your health and wellness. Enclosed is the Care Plan developed with you on 03/14/2024. Please review the Care Plan carefully.    As a reminder, during your visit we talked about:  Ways to manage your physical and mental health  Using health care to maintain and improve your health   Your preventive care needs     Remember to contact your care coordinator if you:  Are hospitalized, or plan to be hospitalized   Have a fall    Have a change in your physical or mental health  Need help finding support or services    If you have questions, or don t agree with your Care Plan, call me at [phone]. You can also call me if your needs change. TTY users, call the [place] [employer] at (973) or 1-589.539.5565 (yvbmkt-pc-bfspfh relay service).    Sincerely,    Sidney Fonseca RN, PHN  [phone]  [email or web address] ([facility]) is a health plan that contracts with both Medicare and the [place] Medical Assistance (Medicaid) program to provide benefits of both programs to enrollees. Enrollment in [facility] depends on contract renewal.    [id number] accepted    [id number] (07/2022)

## 2024-04-18 ENCOUNTER — OFFICE VISIT (OUTPATIENT)
Dept: ORTHOPEDICS | Facility: CLINIC | Age: 79
End: 2024-04-18
Payer: COMMERCIAL

## 2024-04-18 ENCOUNTER — ANCILLARY PROCEDURE (OUTPATIENT)
Dept: GENERAL RADIOLOGY | Facility: CLINIC | Age: 79
End: 2024-04-18
Attending: STUDENT IN AN ORGANIZED HEALTH CARE EDUCATION/TRAINING PROGRAM
Payer: COMMERCIAL

## 2024-04-18 DIAGNOSIS — S82.032P CLOSED DISPLACED TRANSVERSE FRACTURE OF LEFT PATELLA WITH MALUNION, SUBSEQUENT ENCOUNTER: ICD-10-CM

## 2024-04-18 DIAGNOSIS — S82.032P CLOSED DISPLACED TRANSVERSE FRACTURE OF LEFT PATELLA WITH MALUNION, SUBSEQUENT ENCOUNTER: Primary | ICD-10-CM

## 2024-04-18 PROCEDURE — 99024 POSTOP FOLLOW-UP VISIT: CPT | Performed by: STUDENT IN AN ORGANIZED HEALTH CARE EDUCATION/TRAINING PROGRAM

## 2024-04-18 PROCEDURE — 73560 X-RAY EXAM OF KNEE 1 OR 2: CPT | Mod: TC | Performed by: RADIOLOGY

## 2024-04-18 NOTE — PROGRESS NOTES
CC: 10 weeks status post left patella ORIF    HPI:  Patient is a 78-year-old female seen here today with her adult daughter in follow-up 10 weeks status post a left patella ORIF.  She has weaned from her brace.  She is ambulating with use of a cane.  She has regained 0 to 100 degrees of active and passive range of motion of her knee.  Her swelling continues to improve.  She is back to all of her day-to-day activities.  She states that she gets some tightness in her distal quad with flexion.  She is still regaining strength in her quad muscle.  She does not have any knee pain.  She does have some mildly prominent hardware over the medial and lateral wire tails.  Overall she is happy with the function of her knee    Objective:   PE:  LLE: Well-healed surgical incisions over the anterior aspect of the knee.  Palpable wire tails on the medial and lateral aspect of the patella without any nonblanchable or at risk scan.  Passive range of motion is 0 to 100 degrees of knee flexion with soft endpoint.  Active range of motion is: To passive range of motion.  4+/5 knee flexion and extension.    Imagin view x-ray of the left patella from today was reviewed.  This shows consolidation of her fracture.  Hardware in place without signs of loosening or breakage.    A/P:  Patient is a 78-year-old female seen here today with her adult daughter 10 weeks status post a left ORIF.  Overall she is doing very well.  Clinically and by radiographic imaging she has gone on to union.  Her strength and range of motion continues to improve.  She is back to all of her activities of daily living.  She does have some prominent hardware.  I did discuss with her that we can remove this in the future if it continues to bother her.  I would recommend waiting at least 6 months after surgery.  I doubt to answer questions.  She is can to follow-up with me at the end of the summer to see how she is doing clinically.    Kenneth Price MD  Assistant    [facility]   Department of Orthopedic Surgery      Disclaimer: This note consists of symbols derived from keyboarding, dictation and/or voice recognition software. As a result, there may be errors in the script that have gone undetected. Please consider this when interpreting information found in this chart.

## 2024-04-18 NOTE — LETTER
2024         RE: Torri Lowry  [address]  [place] [address]        Dear Colleague,    Thank you for referring your patient, Torri Lowry, to the [facility]. Please see a copy of my visit note below.    CC: 10 weeks status post left patella ORIF    HPI:  Patient is a 78-year-old female seen here today with her adult daughter in follow-up 10 weeks status post a left patella ORIF.  She has weaned from her brace.  She is ambulating with use of a cane.  She has regained 0 to 100 degrees of active and passive range of motion of her knee.  Her swelling continues to improve.  She is back to all of her day-to-day activities.  She states that she gets some tightness in her distal quad with flexion.  She is still regaining strength in her quad muscle.  She does not have any knee pain.  She does have some mildly prominent hardware over the medial and lateral wire tails.  Overall she is happy with the function of her knee    Objective:   PE:  LLE: Well-healed surgical incisions over the anterior aspect of the knee.  Palpable wire tails on the medial and lateral aspect of the patella without any nonblanchable or at risk scan.  Passive range of motion is 0 to 100 degrees of knee flexion with soft endpoint.  Active range of motion is: To passive range of motion.  4+/5 knee flexion and extension.    Imagin view x-ray of the left patella from today was reviewed.  This shows consolidation of her fracture.  Hardware in place without signs of loosening or breakage.    A/P:  Patient is a 78-year-old female seen here today with her adult daughter 10 weeks status post a left ORIF.  Overall she is doing very well.  Clinically and by radiographic imaging she has gone on to union.  Her strength and range of motion continues to improve.  She is back to all of her activities of daily living.  She does have some prominent hardware.  I did discuss with her that we can remove this in the future  if it continues to bother her.  I would recommend waiting at least 6 months after surgery.  I doubt to answer questions.  She is can to follow-up with me at the end of the summer to see how she is doing clinically.    Kenneth Price MD    [facility]   Department of Orthopedic Surgery      Disclaimer: This note consists of symbols derived from keyboarding, dictation and/or voice recognition software. As a result, there may be errors in the script that have gone undetected. Please consider this when interpreting information found in this chart.          Again, thank you for allowing me to participate in the care of your patient.        Sincerely,        Kenneth Price MD

## 2024-07-09 ENCOUNTER — PATIENT OUTREACH (OUTPATIENT)
Dept: CARE COORDINATION | Facility: CLINIC | Age: 79
End: 2024-07-09
Payer: COMMERCIAL

## 2024-07-23 ENCOUNTER — PATIENT OUTREACH (OUTPATIENT)
Dept: CARE COORDINATION | Facility: CLINIC | Age: 79
End: 2024-07-23
Payer: COMMERCIAL

## 2024-08-14 ENCOUNTER — OFFICE VISIT (OUTPATIENT)
Dept: FAMILY MEDICINE | Facility: CLINIC | Age: 79
End: 2024-08-14
Payer: COMMERCIAL

## 2024-08-14 VITALS
OXYGEN SATURATION: 97 % | SYSTOLIC BLOOD PRESSURE: 178 MMHG | HEART RATE: 58 BPM | DIASTOLIC BLOOD PRESSURE: 60 MMHG | TEMPERATURE: 97.9 F | HEIGHT: 57 IN | RESPIRATION RATE: 16 BRPM | WEIGHT: 102.75 LBS | BODY MASS INDEX: 22.17 KG/M2

## 2024-08-14 DIAGNOSIS — R25.2 MUSCLE CRAMPING: ICD-10-CM

## 2024-08-14 DIAGNOSIS — Z78.0 ASYMPTOMATIC MENOPAUSAL STATE: ICD-10-CM

## 2024-08-14 DIAGNOSIS — I10 HYPERTENSION, UNSPECIFIED TYPE: ICD-10-CM

## 2024-08-14 DIAGNOSIS — Z13.820 SCREENING FOR OSTEOPOROSIS: ICD-10-CM

## 2024-08-14 DIAGNOSIS — S82.032A CLOSED DISPLACED TRANSVERSE FRACTURE OF LEFT PATELLA, INITIAL ENCOUNTER: Primary | ICD-10-CM

## 2024-08-14 DIAGNOSIS — Z98.890 S/P KNEE SURGERY: ICD-10-CM

## 2024-08-14 DIAGNOSIS — E78.2 MIXED HYPERLIPIDEMIA: ICD-10-CM

## 2024-08-14 PROCEDURE — 99214 OFFICE O/P EST MOD 30 MIN: CPT | Performed by: FAMILY MEDICINE

## 2024-08-14 RX ORDER — AMLODIPINE BESYLATE 5 MG/1
5 TABLET ORAL DAILY
Qty: 90 TABLET | Refills: 3 | Status: SHIPPED | OUTPATIENT
Start: 2024-08-14

## 2024-08-14 RX ORDER — ASPIRIN 81 MG/1
81 TABLET ORAL DAILY
Qty: 90 TABLET | Refills: 3 | Status: SHIPPED | OUTPATIENT
Start: 2024-08-14

## 2024-08-14 RX ORDER — MULTIVITAMIN WITH IRON
1 TABLET ORAL DAILY
Qty: 90 TABLET | Refills: 3 | Status: SHIPPED | OUTPATIENT
Start: 2024-08-14

## 2024-08-14 RX ORDER — ACETAMINOPHEN 500 MG
500-1000 TABLET ORAL EVERY 6 HOURS PRN
Qty: 100 TABLET | Refills: 3 | Status: SHIPPED | OUTPATIENT
Start: 2024-08-14

## 2024-08-14 NOTE — PROGRESS NOTES
OFFICE VISIT    Assessment/Plan:     Patient Instructions:    -Continue medications as prescribed.   -Restart the amlodipine as prescribed.   -Continue to stay active.   -Follow the recommendations from the knee surgeon.       Please seek immediate medical attention (go to the emergency room or urgent care) for the following reasons: worsening symptoms, or any concerning changes.      Torri was seen today for follow up, referral and medication request.  Diagnoses and all orders for this visit:    Closed displaced transverse fracture of left patella, initial encounter  S/P knee surgery: Doing well, though does endorse some intermittent discomforts located at the sites of the hardware in the right knee cap when she is overly active.  -     aspirin 81 MG EC tablet; Take 1 tablet (81 mg) by mouth daily  -     acetaminophen (TYLENOL) 500 MG tablet; Take 1-2 tablets (500-1,000 mg) by mouth every 6 hours as needed for pain, fever or headaches    Hypertension, unspecified type: Patient reports being out of this medication for a couple of months already.  She did not realize she had refills available at the amlodipine.  New prescription sent.  Reviewed refill pickup at the pharmacy and availability.  Nursing line prescribed as below.  -     amLODIPine (NORVASC) 5 MG tablet; Take 1 tablet (5 mg) by mouth daily    Mixed hyperlipidemia: Stable.  Continue atorvastatin.    Screening for osteoporosis  Asymptomatic menopausal state  -     DEXA HIP/PELVIS/SPINE - Future; Future    Muscle cramping: Plan for trial as below.  Muscle cramping is noted in both upper and lower extremities.  Seems unlikely to be related to DVT or similar conditions.  -     magnesium 250 MG tablet; Take 1 tablet (250 mg) by mouth daily        Return in about 3 months (around 11/14/2024) for Medicare Wellness Visit, Hypertension.    The diagnoses, treatment options, risk, benefits, and recommendations were reviewed with patient/guardian.  Questions were  answered to patient's/guardian satisfaction.  Red flag signs were reviewed.  Patient/guardian is in agreement with above plan.      Subjective: 79 year old female with history of  hypertension, hyperlipidemia who presents to clinic for the following complaints:   Patient presents with:  Follow Up: Medication and left knee pain ,   Referral: dexa  Medication Request    Answers submitted by the patient for this visit:  General Questionnaire (Submitted on 8/14/2024)  Chief Complaint: Chronic problems general questions HPI Form  What is the reason for your visit today? : following up on previous visit  How many servings of fruits and vegetables do you eat daily?: 2-3  On average, how many sweetened beverages do you drink each day (Examples: soda, juice, sweet tea, etc.  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  At that time, it was reported that she had been improving and was not having knee pains.  There is also noted that she was overall happy with the function of her knee.  Patient had return to all of her activities of daily living, though did have some prominent hardware on the kneecap.  Recommendation was to wait at least 6 months after surgery if patient wanted to remove the hardware.  Plans to follow-up at the end of summer to see how she is doing clinically.    Since then, patient has continued to be well. She has some pains at the site of the hardware when she walks, there is some pain. When she just sits and doesn't do much, she does okay and no pain is noted.      Hypertension: Blood pressure noted to be 176/60.  On recheck, blood  "pressure is 178/60.  Patient is currently taking amlodipine 5 mg once daily. Patient is currently asymptomatic.       2/9/2024  3:00 PM 2/9/2024  3:15 PM 2/9/2024  3:30 PM 2/9/2024  3:38 PM 2/9/2024  4:20 PM 3/11/2024  12:09 PM 8/14/2024  9:44 AM   Vital Signs          Systolic 132  132  133   152 !  128  177 !    Diastolic 61  61  61   69 !  62  77 !    Pulse 66  66  67  68  77  62  58       8/14/2024  9:57 AM   Vital Signs    Systolic 176 !    Diastolic 60 !    Pulse           HLD: Currently on atorvastatin 40 mg once daily.    Muscle cramping: Patient getting intermittent muscle cramping noted in the upper and lower extremities bilaterally.  This seems to happen mostly at nighttime.  Referral to take seems to be okay overall.  Reviewed conservative management and recommendations.    HM due was reviewed with patient/parent.  Recommendations, risk, benefits were reviewed.  Accepted recommendations were ordered.  Otherwise, patient/parent declined.    Health Maintenance Due   Topic Date Due    DEXA  Never done    ZOSTER IMMUNIZATION (1 of 2) Never done    RSV VACCINE (Pregnancy & 60+) (1 - 1-dose 60+ series) Never done    COVID-19 Vaccine (1 - 2023-24 season) Never done    ANNUAL REVIEW OF HM ORDERS  06/23/2024    MEDICARE ANNUAL WELLNESS VISIT  07/24/2024         Immunization History   Administered Date(s) Administered    Pneumococcal 20 valent Conjugate (Prevnar 20) 01/01/2022    TDAP (Adacel,Boostrix) 01/01/2022         Patient presents with son.     The 10 point review of system is negative except as stated in the HPI.    Allergies were reviewed and updated.    Objective:   BP (!) 178/60 (BP Location: Left arm, Patient Position: Sitting, Cuff Size: Adult Regular)   Pulse 58   Temp 97.9  F (36.6  C) (Oral)   Resp 16   Ht 1.448 m (4' 9\")   Wt 46.6 kg (102 lb 12 oz)   SpO2 97%   BMI 22.23 kg/m    General: Active, alert, nontoxic-appearing.  No acute distress.  HEENT: Normocephalic, atraumatic.  Pupils are " equal and round.  Sclera is clear.  Normal external ears. Nares patent.  Moist mucous membranes.    Cardiac: bradycardic.  S1, S2 present.  No murmurs, rubs, or gallops.  Respiratory/chest: Clear to auscultation bilaterally.  No wheezes, rales, rhonchi.  Breathing is not labored.  No accessory muscle usage.  Extremities: Left lower extremity: From, small protrusions noted from the left kneecap region.  No surrounding skin changes otherwise.  No evidence of induration.  Nontender to palpation.  No increased warmth.  Voluntary movements intact.  Otherwise, patient doing well.  Integumentary: No concerning rash or skin changes appreciated.        Chris Fonseca MD  [facility] [place] [address]  Phone: [phone]  Fax: [phone]    8/19/2024  8:16 AM          Current Outpatient Medications   Medication Sig Dispense Refill    acetaminophen (TYLENOL) 500 MG tablet Take 1-2 tablets (500-1,000 mg) by mouth every 6 hours as needed for pain, fever or headaches 100 tablet 3    amLODIPine (NORVASC) 5 MG tablet Take 1 tablet (5 mg) by mouth daily 90 tablet 3    aspirin 81 MG EC tablet Take 1 tablet (81 mg) by mouth daily 90 tablet 3    atorvastatin (LIPITOR) 40 MG tablet Take 1 tablet (40 mg) by mouth at bedtime 90 tablet 3    cetirizine (ZYRTEC) 10 MG tablet Take 1 tablet (10 mg) by mouth daily as needed for allergies (itching.) 90 tablet 3    magnesium 250 MG tablet Take 1 tablet (250 mg) by mouth daily 90 tablet 3    Multiple Vitamins-Minerals (MULTIVITAMIN WOMEN 50+) TABS Take 1 chew tab by mouth daily 90 tablet 3    ondansetron (ZOFRAN ODT) 4 MG ODT tab Take 1 tablet (4 mg) by mouth every 8 hours as needed for nausea 10 tablet 0    senna-docusate (SENOKOT-S/PERICOLACE) 8.6-50 MG tablet Take 1-2 tablets by mouth 2 times daily 30 tablet 0    triamcinolone (KENALOG) 0.1 % external cream Apply topically 2 times daily as needed for irritation (rash, itching.) 45 g 3     No current  facility-administered medications for this visit.       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 02/20/2024     Priority: Medium    S/P knee surgery 02/20/2024     Priority: Medium    Hypertension, unspecified type 06/23/2023     Priority: Medium    Mixed hyperlipidemia 06/23/2023     Priority: Medium       Family History   Problem Relation Age of Onset    Diabetes No family hx of     Coronary Artery Disease No family hx of     Hyperlipidemia No family hx of        Past Surgical History:   Procedure Laterality Date    No previous surgery      OPEN REDUCTION INTERNAL FIXATION PATELLA Left 2/9/2024    Procedure: OPEN REDUCTION INTERNAL FIXATION, FRACTURE, PATELLA;  Surgeon: Kenneth Price MD;  Location: [facility] OR        Social History     Socioeconomic History    Marital status:      Spouse name: Not on file    Number of children: Not on file    Years of education: Not on file    Highest education level: Not on file   Occupational History    Not on file   Tobacco Use    Smoking status: Never     Passive exposure: Never    Smokeless tobacco: Never   Vaping Use    Vaping status: Never Used   Substance and Sexual Activity    Alcohol use: Not on file    Drug use: Not on file    Sexual activity: Not on file   Other Topics Concern    Not on file   Social History Narrative    Not on file     Social Determinants of Health     Financial Resource Strain: Low Risk  (1/31/2024)    Financial Resource Strain     Within the past 12 months, have you or your family members you live with been unable to get utilities (heat, electricity) when it was really needed?: No   Food Insecurity: Low Risk  (1/31/2024)    Food Insecurity     Within the past 12 months, did you worry that your food would run out before you got money to buy more?: No     Within the past 12  months, did the food you bought just not last and you didn t have money to get more?: No   Transportation Needs: Low Risk  (1/31/2024)    Transportation Needs     Within the past 12 months, has lack of transportation kept you from medical appointments, getting your medicines, non-medical meetings or appointments, work, or from getting things that you need?: No   Physical Activity: Not on file   Stress: Not on file   Social Connections: Not on file   Interpersonal Safety: Unknown (8/14/2024)    Interpersonal Safety     Do you feel physically and emotionally safe where you currently live?: Patient unable to answer     Within the past 12 months, have you been hit, slapped, kicked or otherwise physically hurt by someone?: Patient unable to answer     Within the past 12 months, have you been humiliated or emotionally abused in other ways by your partner or ex-partner?: Patient unable to answer   Housing Stability: Low Risk  (1/31/2024)    Housing Stability     Do you have housing? : Yes     Are you worried about losing your housing?: No

## 2024-08-14 NOTE — PATIENT INSTRUCTIONS
-Thank you for choosing the [facility].  -It was a pleasure to see you today.  -Please take a look at the information below for more specific details regarding the treatment plan and recommendations.  -In this after visit summary is a list of your medications and specific instructions.  Please review this carefully as there may be changes made to your medication list.  -If there are any particular questions or concerns, please feel free to reach out to Dr. Fonseca.  -If any labs have been completed, we will reach out to you about results.  If the results are normal or not concerning, a letter or [employer] message will be sent to you.  If any follow-up is needed, either Dr. Fonseca or the nurse will give you a call.  If you have not heard regarding results after 2 weeks, please reach out to the clinic.    Patient Instructions:    -Continue medications as prescribed.   -Restart the amlodipine as prescribed.   -Continue to stay active.   -Follow the recommendations from the knee surgeon.       Please seek immediate medical attention (go to the emergency room or urgent care) for the following reasons: worsening symptoms, or any concerning changes.      --------------------------------------------------------------------------------------------------------------------    -We are always looking for ways to improve.  You may be selected to receive a survey regarding your visit today.  We encourage you to complete the survey and provide specific, constructive feedback to help us improve our processes.  Thank you for your time!  -Please review the contact information listed on the after visit summary and in the electronic chart.  Below is the phone number that we have on file.  If there are any changes that are needed to be made, please reach out to the clinic.  [phone] (home)

## 2024-08-15 ENCOUNTER — OFFICE VISIT (OUTPATIENT)
Dept: ORTHOPEDICS | Facility: CLINIC | Age: 79
End: 2024-08-15
Payer: COMMERCIAL

## 2024-08-15 ENCOUNTER — ANCILLARY PROCEDURE (OUTPATIENT)
Dept: GENERAL RADIOLOGY | Facility: CLINIC | Age: 79
End: 2024-08-15
Attending: STUDENT IN AN ORGANIZED HEALTH CARE EDUCATION/TRAINING PROGRAM
Payer: COMMERCIAL

## 2024-08-15 VITALS — WEIGHT: 102 LBS | HEIGHT: 57 IN | BODY MASS INDEX: 22.01 KG/M2

## 2024-08-15 DIAGNOSIS — S82.032P CLOSED DISPLACED TRANSVERSE FRACTURE OF LEFT PATELLA WITH MALUNION, SUBSEQUENT ENCOUNTER: ICD-10-CM

## 2024-08-15 DIAGNOSIS — S82.032P CLOSED DISPLACED TRANSVERSE FRACTURE OF LEFT PATELLA WITH MALUNION, SUBSEQUENT ENCOUNTER: Primary | ICD-10-CM

## 2024-08-15 PROCEDURE — 99213 OFFICE O/P EST LOW 20 MIN: CPT | Performed by: STUDENT IN AN ORGANIZED HEALTH CARE EDUCATION/TRAINING PROGRAM

## 2024-08-15 PROCEDURE — 73562 X-RAY EXAM OF KNEE 3: CPT | Mod: LT | Performed by: STUDENT IN AN ORGANIZED HEALTH CARE EDUCATION/TRAINING PROGRAM

## 2024-08-15 NOTE — PROGRESS NOTES
CC: 6-month status post left patella ORIF    HPI: Patient is a 79-year-old female seen here today with her adult daughter in follow-up 6-month status post left patella ORIF.  Overall she has been doing well.  She is ambulate without assistive device.  She has regained full passive and active extension.  Her flexion is limited to approximately 100 210 degrees.  She states that pain over her wire site limits her flexion.  She can palpate the wires underneath her skin and these are occasionally painful.  Otherwise she is very happy with the function of her knee pain    Objective:   PE:  LLE: Well-healed surgical incision over the anterior aspect of the knee.  No effusion or edema.  Metal wires are palpable medial and lateral to the patella.  The screw heads are not palpable in the quad or patellar tendon.  Passive range of motion is 0 to 110 degrees of knee flexion.  Active range of motion is equivalent of passive range of motion.  5/5 knee flexion and extension without extensor leg.    Imaging:   Three-view imaging of the left knee shows well-positioned screw and metallic wire without signs of hardware loosening or breakage.  Patella fracture has gone on to union with no signs of fracture line    A/P:  Patient is a 79-year-old female seen here today in follow-up 6-month status post a left patella ORIF.  Radiographically and by clinical exam she has gone on to union.  She is ambulate without assistive device.  She has regained full active extension without extensor lag.  Her wires are palpable subcutaneously and do bother her.  She states that this is what limits her flexion, not stiffness over the patellar tendon.  I discussed with her that we can remove the wires.  Given her age and bone quality, I would not recommend removing the screws at this time as the screw heads do not seem to irritate her.  I do think is very reasonable to remove the wires electively for pain.  I discussed the operative technique and  postoperative protocol.  Discussed risk benefits of operative intervention clued but not limited to bleeding, infection, failure to cure pain, reimaging, stiffness, damage to artery nerves and blood vessels, loss of limb and loss of life.  Scheduled to the hardware removal is elective.  She has a standing offer for me for hardware removal of her metallic wires.  She states she would like to think about this more and call me back if she is interested in hardware removal.  That is fine with me.  From standpoint of her fracture, she can follow-up as needed for any future knee issues.    Kenneth Price MD    [facility]   Department of Orthopedic Surgery      Disclaimer: This note consists of symbols derived from keyboarding, dictation and/or voice recognition software. As a result, there may be errors in the script that have gone undetected. Please consider this when interpreting information found in this chart.

## 2024-08-15 NOTE — LETTER
8/15/2024      Torri Lowry  [address]  [place] [address]      Dear Colleague,    Thank you for referring your patient, Torri Lowry, to the [facility]. Please see a copy of my visit note below.    CC: 6-month status post left patella ORIF    HPI: Patient is a 79-year-old female seen here today with her adult daughter in follow-up 6-month status post left patella ORIF.  Overall she has been doing well.  She is ambulate without assistive device.  She has regained full passive and active extension.  Her flexion is limited to approximately 100 210 degrees.  She states that pain over her wire site limits her flexion.  She can palpate the wires underneath her skin and these are occasionally painful.  Otherwise she is very happy with the function of her knee pain    Objective:   PE:  LLE: Well-healed surgical incision over the anterior aspect of the knee.  No effusion or edema.  Metal wires are palpable medial and lateral to the patella.  The screw heads are not palpable in the quad or patellar tendon.  Passive range of motion is 0 to 110 degrees of knee flexion.  Active range of motion is equivalent of passive range of motion.  5/5 knee flexion and extension without extensor leg.    Imaging:   Three-view imaging of the left knee shows well-positioned screw and metallic wire without signs of hardware loosening or breakage.  Patella fracture has gone on to union with no signs of fracture line    A/P:  Patient is a 79-year-old female seen here today in follow-up 6-month status post a left patella ORIF.  Radiographically and by clinical exam she has gone on to union.  She is ambulate without assistive device.  She has regained full active extension without extensor lag.  Her wires are palpable subcutaneously and do bother her.  She states that this is what limits her flexion, not stiffness over the patellar tendon.  I discussed with her that we can remove the wires.  Given her age and  bone quality, I would not recommend removing the screws at this time as the screw heads do not seem to irritate her.  I do think is very reasonable to remove the wires electively for pain.  I discussed the operative technique and postoperative protocol.  Discussed risk benefits of operative intervention clued but not limited to bleeding, infection, failure to cure pain, reimaging, stiffness, damage to artery nerves and blood vessels, loss of limb and loss of life.  Scheduled to the hardware removal is elective.  She has a standing offer for me for hardware removal of her metallic wires.  She states she would like to think about this more and call me back if she is interested in hardware removal.  That is fine with me.  From standpoint of her fracture, she can follow-up as needed for any future knee issues.    Kenneth Price MD    [facility]   Department of Orthopedic Surgery      Disclaimer: This note consists of symbols derived from keyboarding, dictation and/or voice recognition software. As a result, there may be errors in the script that have gone undetected. Please consider this when interpreting information found in this chart.        Again, thank you for allowing me to participate in the care of your patient.        Sincerely,        Kenneth Price MD

## 2024-08-19 PROBLEM — R25.2 MUSCLE CRAMPING: Status: ACTIVE | Noted: 2024-08-19

## 2024-08-19 PROBLEM — Z98.890 S/P KNEE SURGERY: Status: ACTIVE | Noted: 2024-02-09

## 2024-08-28 ENCOUNTER — TELEPHONE (OUTPATIENT)
Dept: FAMILY MEDICINE | Facility: CLINIC | Age: 79
End: 2024-08-28
Payer: COMMERCIAL

## 2024-08-28 NOTE — TELEPHONE ENCOUNTER
Patient Quality Outreach    Patient is due for the following:   Physical Annual Wellness Visit      Topic Date Due    Zoster (Shingles) Vaccine (1 of 2) Never done    COVID-19 Vaccine (1 - 2023-24 season) Never done       Next Steps:   Patient has upcoming appointment, these items will be addressed at that time.    Type of outreach:    Chart review performed, no outreach needed.      Questions for provider review:    None           Pelon Carpenter MA  Chart routed to Care Team.

## 2024-09-19 ENCOUNTER — PATIENT OUTREACH (OUTPATIENT)
Dept: GERIATRIC MEDICINE | Facility: CLINIC | Age: 79
End: 2024-09-19
Payer: COMMERCIAL

## 2024-09-19 NOTE — PROGRESS NOTES
[facility] Care Coordination Contact      [facility] Six-Month Telephone Assessment    6 month telephone assessment completed on 9/19/24.    ER visits: No  Hospitalizations: No  TCU stays: No  Significant health status changes: No.  Falls/Injuries: No  ADL/IADL changes: No  Changes in services: No    Caregiver Assessment follow up:  NA    Goals: See Support Plan for goal progress documentation.      Member has not picked a formal homemaker yet. She asked CC to contact daughter-in-law Leighann.    Called daughter-in-law , not available and left voice mail.    Will see member in 6 months for an annual health risk assessment.   Encouraged member to call CC with any questions or concerns in the meantime.     Sidney Fonseca RN, PHN   [facility]  [phone]

## 2024-11-20 ENCOUNTER — OFFICE VISIT (OUTPATIENT)
Dept: FAMILY MEDICINE | Facility: CLINIC | Age: 79
End: 2024-11-20
Payer: COMMERCIAL

## 2024-11-20 VITALS
SYSTOLIC BLOOD PRESSURE: 150 MMHG | HEART RATE: 59 BPM | OXYGEN SATURATION: 100 % | BODY MASS INDEX: 21.6 KG/M2 | DIASTOLIC BLOOD PRESSURE: 60 MMHG | TEMPERATURE: 98.3 F | WEIGHT: 100.12 LBS | HEIGHT: 57 IN | RESPIRATION RATE: 20 BRPM

## 2024-11-20 DIAGNOSIS — R73.9 HYPERGLYCEMIA: ICD-10-CM

## 2024-11-20 DIAGNOSIS — Z00.00 ENCOUNTER FOR MEDICARE ANNUAL WELLNESS EXAM: Primary | ICD-10-CM

## 2024-11-20 DIAGNOSIS — I10 HYPERTENSION, UNSPECIFIED TYPE: ICD-10-CM

## 2024-11-20 DIAGNOSIS — H53.9 VISION CHANGES: ICD-10-CM

## 2024-11-20 DIAGNOSIS — H26.9 CATARACT, UNSPECIFIED CATARACT TYPE, UNSPECIFIED LATERALITY: ICD-10-CM

## 2024-11-20 DIAGNOSIS — E78.2 MIXED HYPERLIPIDEMIA: ICD-10-CM

## 2024-11-20 DIAGNOSIS — M54.41 RIGHT-SIDED LOW BACK PAIN WITH RIGHT-SIDED SCIATICA, UNSPECIFIED CHRONICITY: ICD-10-CM

## 2024-11-20 DIAGNOSIS — M54.16 LUMBAR RADICULOPATHY: ICD-10-CM

## 2024-11-20 DIAGNOSIS — Z23 NEED FOR VACCINATION: ICD-10-CM

## 2024-11-20 LAB
EST. AVERAGE GLUCOSE BLD GHB EST-MCNC: 111 MG/DL
HBA1C MFR BLD: 5.5 % (ref 0–5.6)

## 2024-11-20 PROCEDURE — 36415 COLL VENOUS BLD VENIPUNCTURE: CPT | Performed by: FAMILY MEDICINE

## 2024-11-20 PROCEDURE — 80061 LIPID PANEL: CPT | Performed by: FAMILY MEDICINE

## 2024-11-20 PROCEDURE — G0439 PPPS, SUBSEQ VISIT: HCPCS | Performed by: FAMILY MEDICINE

## 2024-11-20 PROCEDURE — 82248 BILIRUBIN DIRECT: CPT | Performed by: FAMILY MEDICINE

## 2024-11-20 PROCEDURE — 83036 HEMOGLOBIN GLYCOSYLATED A1C: CPT | Performed by: FAMILY MEDICINE

## 2024-11-20 PROCEDURE — 80053 COMPREHEN METABOLIC PANEL: CPT | Performed by: FAMILY MEDICINE

## 2024-11-20 PROCEDURE — 99214 OFFICE O/P EST MOD 30 MIN: CPT | Mod: 25 | Performed by: FAMILY MEDICINE

## 2024-11-20 RX ORDER — IBUPROFEN 600 MG/1
600 TABLET, FILM COATED ORAL EVERY 6 HOURS PRN
Qty: 60 TABLET | Refills: 1 | Status: SHIPPED | OUTPATIENT
Start: 2024-11-20

## 2024-11-20 RX ORDER — ACETAMINOPHEN 500 MG
500-1000 TABLET ORAL EVERY 6 HOURS PRN
Qty: 100 TABLET | Refills: 3 | Status: SHIPPED | OUTPATIENT
Start: 2024-11-20

## 2024-11-20 SDOH — HEALTH STABILITY: PHYSICAL HEALTH: ON AVERAGE, HOW MANY DAYS PER WEEK DO YOU ENGAGE IN MODERATE TO STRENUOUS EXERCISE (LIKE A BRISK WALK)?: 4 DAYS

## 2024-11-20 ASSESSMENT — PATIENT HEALTH QUESTIONNAIRE - PHQ9
SUM OF ALL RESPONSES TO PHQ QUESTIONS 1-9: 15
10. IF YOU CHECKED OFF ANY PROBLEMS, HOW DIFFICULT HAVE THESE PROBLEMS MADE IT FOR YOU TO DO YOUR WORK, TAKE CARE OF THINGS AT HOME, OR GET ALONG WITH OTHER PEOPLE: SOMEWHAT DIFFICULT
SUM OF ALL RESPONSES TO PHQ QUESTIONS 1-9: 15

## 2024-11-20 ASSESSMENT — SOCIAL DETERMINANTS OF HEALTH (SDOH): HOW OFTEN DO YOU GET TOGETHER WITH FRIENDS OR RELATIVES?: ONCE A WEEK

## 2024-11-20 NOTE — PROGRESS NOTES
Preventive Care Visit  [facility], Family Medicine  Nov 20, 2024      Assessment & Plan     Patient Instructions:    -You were referred to the eye doctor.   -If you do not hear from the specialist to schedule an appointment within a week's time from today, please call the [facility] and speak with the specialty  to help you schedule the appointment to see the specialist.  Depending on the specialist availability, it may be a number of weeks prior to your scheduled appointment.    -Please call to schedule a bone scan (Dexa scan) to check your bone health.   -Dr. Fonseca recommends getting the COVID vaccine.     -Take ibuprofen/Advil 600 mg once every 6 hours as needed for pain. Daily long-term use for ibuprofen is not recommended due to potential side effects.  -Take acetaminophen/Tylenol 1000 mg once every 8 hours as needed for pain.   -Do the exercises attached.    -Rest and limit usage of the affected area.  -Try to remain active and limit your activity based on discomfort.  -Apply a cold pack to the affected area for a maximum of 20 minutes at a time, once an hour as needed for pain and swelling.  Application of the cold pack for more than 20 minutes can increase risk of injuries to surrounding tissue.  -Apply a warm pack to the affected area as needed for discomforts.  The warm pack will help to improve blood flow and relax surrounding tissues.  You may make your own warm pack by doing the following: Take a sock, fill the sock with uncooked rice, and tie it off at the opened end.  You may place the sock and rice in the microwave for 30-60 seconds at a time or until warm.  Be cautious that the sock/rice is not too hot.  -Do stretches to help relax the surrounding muscles.  When doing stretches, be sure to hold your body in that position (avoid moving) and hold the stretch for 30 seconds.     -You are doing great.  -Continue to eat well.  Try to increase your  servings of calcium as this can help your bones stay strong and healthy.  Follow a nutrition plan rich in fruits and vegetables and low in fats and cholesterol.    -Be sure to eat 5-7 servings of fruits and vegetables each day.  -Find ways to stay active.  Try to get 150 minutes of moderate activity (where you are breathing faster and slightly sweating) each week.  -Try to maintain a body mass index (BMI) of 18.5-25 as this is considered a healthier weight range.  -Brush your teeth twice daily.  See a dentist every 6-12 months.  -Be sure to use sunblock with SPF 15 or greater when going outside for extended periods of time.  Sunblock should be used even when it is a cloudy day.  Do intermittent skin checks for any concerning skin changes.  Wearing a wide brimmed hat and sunglasses can also be helpful to protect your skin from the sun.  -Monitor for any abnormal skin changes (such as new moles/spots, painful moles, changes in your old moles, wounds that will not heal, multiple colors noted in one lesion, lesions that are asymmetric or not circular, or anything that is concerning for you). If any of these are noted, please schedule an appointment to be seen.     -It is generally recommended for you to complete a health care directive or living will. These documents will be able to reflect your wishes and desire in the case that you are unable to express them yourself. Please let Dr. Fonseca know if you would like some assistance with this process.      Please seek immediate medical attention (go to the emergency room or urgent care) for the following reasons: worsening symptoms, or any concerning changes.      Torri was seen today for physical.  Diagnoses and all orders for this visit:    Encounter for Medicare annual wellness exam  Need for vaccination  Hypertension, unspecified type  Mixed hyperlipidemia  Hyperglycemia  -     Hemoglobin A1c; Future  -     Basic metabolic panel; Future  -     Hepatic function panel;  Future  -     Lipid panel reflex to direct LDL Fasting; Future    Vision changes  -     Adult Eye  Referral; Future    Cataract, unspecified cataract type, unspecified laterality  -     Adult Eye  Referral; Future    Lumbar radiculopathy  -     acetaminophen (TYLENOL) 500 MG tablet; Take 1-2 tablets (500-1,000 mg) by mouth every 6 hours as needed for pain or fever.  -     ibuprofen (ADVIL/MOTRIN) 600 MG tablet; Take 1 tablet (600 mg) by mouth every 6 hours as needed for fever (pain).    Right-sided low back pain with right-sided sciatica, unspecified chronicity  -     acetaminophen (TYLENOL) 500 MG tablet; Take 1-2 tablets (500-1,000 mg) by mouth every 6 hours as needed for pain or fever.  -     ibuprofen (ADVIL/MOTRIN) 600 MG tablet; Take 1 tablet (600 mg) by mouth every 6 hours as needed for fever (pain).    Other orders  -     PRIMARY CARE FOLLOW-UP SCHEDULING; Future            Patient has been advised of split billing requirements and indicates understanding: Yes        Depression Screening Follow Up        11/20/2024    11:51 AM   PHQ   PHQ-9 Total Score 15    Q9: Thoughts of better off dead/self-harm past 2 weeks Not at all        Patient-reported           Follow Up Actions Taken  Patient counseled, no additional follow up at this time.  Referred patient back to PCP     Counseling  Appropriate preventive services were addressed with this patient via screening, questionnaire, or discussion as appropriate for fall prevention, nutrition, physical activity, Tobacco-use cessation, social engagement, weight loss and cognition.  Checklist reviewing preventive services available has been given to the patient.  Reviewed patient's diet, addressing concerns and/or questions.   The patient was instructed to see the dentist every 6 months.   She is at risk for psychosocial distress and has been provided with information to reduce risk.   Updated plan of care.  Patient reported difficulty with activities  of daily living were addressed today.The patient's PHQ-9 score is consistent with moderate depression. She was provided with information regarding depression.     Answers submitted by the patient for this visit:  Patient Health Questionnaire (Submitted on 11/20/2024)  If you checked off any problems, how difficult have these problems made it for you to do your work, take care of things at home, or get along with other people?: Somewhat difficult  PHQ9 TOTAL SCORE: 15      Gustavo Wild is a 79 year old, presenting for the following:  Physical (Problems with vision, would like to discuss medication or a referral if needed. )    Vision problems: occluded vision noted just starting this year. Previous years, she could see just fine. The eyes are itchy at times. No dryness noted. She tried OTC eye drops and that helps a little bit. The right side can't see very well.   ***opacity noted on the right. Normal appearing lens on the left.     Left knee pain: noted just if walking really hard. She has talked with the knee doctor about removal of the hardware. After their discussion, plan is to just monitor at this time due to patient's age and   ***Well healed midline scar. Two hard screws noted***    BP: she checks the BP at home. Typically in the 120-130 range in the past.  This week, she has pain in the right hip and right leg that  She eats healthy.     Pain noted in the right low back into the right leg. Some numbness noted as well.         11/20/2024    11:31 AM   Additional Questions   Roomed by Ciara GONZALEZ   Accompanied by Son in law         HPI  ***    -Reviewed healthy eating and exercise.  -Skin cancer screening: No concerning skin changes expressed by patient.  -Smoking status:   Tobacco Use      Smoking status: Never        Passive exposure: Never      Smokeless tobacco: Never      -Family history:   Family History   Problem Relation Age of Onset    Diabetes No family hx of     Coronary Artery Disease No family  hx of     Hyperlipidemia No family hx of        Preventative health recommendations, evaluation options, and risk/benefits of each were discussed with patient. Accepted recommendations were ordered. Otherwise, patient declined.  Health Maintenance Due   Topic Date Due    DEXA  Never done    ZOSTER IMMUNIZATION (1 of 2) Never done    RSV VACCINE (1 - 1-dose 75+ series) Never done    ANNUAL REVIEW OF HM ORDERS  06/23/2024    MEDICARE ANNUAL WELLNESS VISIT  07/24/2024    INFLUENZA VACCINE (1) Never done    COVID-19 Vaccine (1 - 2024-25 season) Never done     Completed either the flu or COVID shot (likely the flu shot from 09/2024).   Dexa scan ordered 08/14/2024.    -Immunizations due were reviewed.    Immunization History   Administered Date(s) Administered    Pneumococcal 20 valent Conjugate (Prevnar 20) 01/01/2022    TDAP (Adacel,Boostrix) 01/01/2022       -Labs: Laboratory recommendations reviewed with patient.        Health Care Directive  Patient does not have a Health Care Directive: Discussed advance care planning with patient; information given to patient to review.      11/20/2024   General Health   How would you rate your overall physical health? Good   Feel stress (tense, anxious, or unable to sleep) To some extent      (!) STRESS CONCERN      11/20/2024   Nutrition   Diet: Low salt    Low fat/cholesterol    Vegetarian/vegan       Multiple values from one day are sorted in reverse-chronological order         11/20/2024   Exercise   Days per week of moderate/strenous exercise 4 days            11/20/2024   Social Factors   Frequency of gathering with friends or relatives Once a week   Worry food won't last until get money to buy more No   Food not last or not have enough money for food? No   Do you have housing? (Housing is defined as stable permanent housing and does not include staying ouside in a car, in a tent, in an abandoned building, in an overnight shelter, or couch-surfing.) Yes   Are you worried  about losing your housing? No   Lack of transportation? No   Unable to get utilities (heat,electricity)? No            11/20/2024   Fall Risk   Fallen 2 or more times in the past year? Yes    Trouble with walking or balance? Yes        Patient-reported   Patient had fallen and fractured her patella. She had patella surgery. Recovering from that, still.         11/20/2024   Activities of Daily Living- Home Safety   Needs help with the following daily activites Transportation    Shopping    Preparing meals    Housework    Laundry    Medication administration    Money management   Safety concerns in the home None of the above       Multiple values from one day are sorted in reverse-chronological order         11/20/2024   Dental   Dentist two times every year? (!) NO            11/20/2024   Hearing Screening   Hearing concerns? None of the above            11/20/2024   Driving Risk Screening   Patient/family members have concerns about driving (!) DECLINE            11/20/2024   General Alertness/Fatigue Screening   Have you been more tired than usual lately? No            11/20/2024   Urinary Incontinence Screening   Bothered by leaking urine in past 6 months No            11/20/2024   TB Screening   Were you born outside of the [place]? Yes        Today's PHQ-2 Score:       11/20/2024    11:51 AM   PHQ-2 ( 1999 Pf[employer])   Q1: Little interest or pleasure in doing things 2    Q2: Feeling down, depressed or hopeless 2    PHQ-2 Score 4    Q1: Little interest or pleasure in doing things More than half the days   Q2: Feeling down, depressed or hopeless More than half the days   PHQ-2 Score 4       Patient-reported         11/20/2024   Substance Use   Alcohol more than 3/day or more than 7/wk No   Do you have a current opioid prescription? No   How severe/bad is pain from 1 to 10? 0/10 (No Pain)   Do you use any other substances recreationally? No        Social History     Tobacco Use    Smoking status: Never     Passive  exposure: Never    Smokeless tobacco: Never   Vaping Use    Vaping status: Never Used         ASCVD Risk   The 10-year ASCVD risk score (Aurelio KHAN, et al., 2019) is: 36.1%    Values used to calculate the score:      Age: 79 years      Sex: Female      Is Non- : No      Diabetic: No      Tobacco smoker: No      Systolic Blood Pressure: 150 mmHg      Is BP treated: Yes      HDL Cholesterol: 49 mg/dL      Total Cholesterol: 241 mg/dL      Reviewed and updated as needed this visit by Provider                    Past Medical History:   Diagnosis Date    Hypertension      Past Surgical History:   Procedure Laterality Date    No previous surgery      OPEN REDUCTION INTERNAL FIXATION PATELLA Left 2/9/2024    Procedure: OPEN REDUCTION INTERNAL FIXATION, FRACTURE, PATELLA;  Surgeon: Kenneth Price MD;  Location: [facility] Main OR     Current providers sharing in care for this patient include:  Patient Care Team:  Nadege Gonsalez MD as PCP - General (Family Medicine)  Chris Fonseca MD as Assigned PCP  Sidney Fonseca RN as Lead Care Coordinator (Primary Care - CC)  Kenneth Price MD as Assigned Musculoskeletal Provider    The following health maintenance items are reviewed in Epic and correct as of today:  Health Maintenance   Topic Date Due    DEXA  Never done    ZOSTER IMMUNIZATION (1 of 2) Never done    RSV VACCINE (1 - 1-dose 75+ series) Never done    ANNUAL REVIEW OF HM ORDERS  06/23/2024    INFLUENZA VACCINE (1) Never done    COVID-19 Vaccine (1 - 2024-25 season) Never done    BMP  02/07/2025    LIPID  02/07/2025    MEDICARE ANNUAL WELLNESS VISIT  11/20/2025    FALL RISK ASSESSMENT  11/20/2025    GLUCOSE  02/07/2027    ADVANCE CARE PLANNING  02/07/2029    DTAP/TDAP/TD IMMUNIZATION (2 - Td or Tdap) 01/01/2032    HEPATITIS C SCREENING  Completed    PHQ-2 (once per calendar year)  Completed    Pneumococcal Vaccine: 65+ Years  Completed    HPV IMMUNIZATION  Aged Out    MENINGITIS  "IMMUNIZATION  Aged Out    RSV MONOCLONAL ANTIBODY  Aged Out       The 10 point review of system was negative unless otherwise stated in the HPI.       Objective    Exam  BP (!) 150/60   Pulse 59   Temp 98.3  F (36.8  C) (Oral)   Resp 20   Ht 1.444 m (4' 8.85\")   Wt 45.4 kg (100 lb 1.9 oz)   SpO2 100%   BMI 21.78 kg/m     Estimated body mass index is 21.78 kg/m  as calculated from the following:    Height as of this encounter: 1.444 m (4' 8.85\").    Weight as of this encounter: 45.4 kg (100 lb 1.9 oz).    Physical Exam  GENERAL: alert and no distress  EYES: Eyes grossly normal to inspection, PERRL and conjunctivae and sclerae normal  HENT: ear canals and TM's normal, nose and mouth without ulcers or lesions  NECK: no adenopathy, no asymmetry, masses, or scars  RESP: lungs clear to auscultation - no rales, rhonchi or wheezes  CV: regular rate and rhythm, normal S1 S2, no S3 or S4, no murmur, click or rub, no peripheral edema  ABDOMEN: soft, nontender, no hepatosplenomegaly, no masses and bowel sounds normal  MS: no gross musculoskeletal defects noted, no edema  SKIN: no suspicious lesions or rashes  NEURO: Normal strength and tone, mentation intact and speech normal  PSYCH: mentation appears normal, affect normal/bright        11/20/2024   Mini Cog   Clock Draw Score 0 Abnormal   3 Item Recall 3 objects recalled   Mini Cog Total Score 3        Patient grew up in a different culture/country.          Signed Electronically by:     ***      "

## 2024-11-20 NOTE — PATIENT INSTRUCTIONS
-Thank you for choosing the [facility].  -It was a pleasure to see you today.  -Please take a look at the information below for more specific details regarding the treatment plan and recommendations.  -In this after visit summary is a list of your medications and specific instructions.  Please review this carefully as there may be changes made to your medication list.  -If there are any particular questions or concerns, please feel free to reach out to Dr. Fonseca.  -If any labs have been completed, we will reach out to you about results.  If the results are normal or not concerning, a letter or [employer] message will be sent to you.  If any follow-up is needed, either Dr. Fonseca or the nurse will give you a call.  If you have not heard regarding results after 2 weeks, please reach out to the clinic.    Patient Instructions:    -You were referred to the eye doctor.   -If you do not hear from the specialist to schedule an appointment within a week's time from today, please call the [facility] and speak with the specialty  to help you schedule the appointment to see the specialist.  Depending on the specialist availability, it may be a number of weeks prior to your scheduled appointment.    -Please call to schedule a bone scan (Dexa scan) to check your bone health.   -Dr. Fonseca recommends getting the COVID vaccine.     -Take ibuprofen/Advil 600 mg once every 6 hours as needed for pain. Daily long-term use for ibuprofen is not recommended due to potential side effects.  -Take acetaminophen/Tylenol 1000 mg once every 8 hours as needed for pain.   -Do the exercises attached.    -Rest and limit usage of the affected area.  -Try to remain active and limit your activity based on discomfort.  -Apply a cold pack to the affected area for a maximum of 20 minutes at a time, once an hour as needed for pain and swelling.  Application of the cold pack for more than 20 minutes can increase risk of  injuries to surrounding tissue.  -Apply a warm pack to the affected area as needed for discomforts.  The warm pack will help to improve blood flow and relax surrounding tissues.  You may make your own warm pack by doing the following: Take a sock, fill the sock with uncooked rice, and tie it off at the opened end.  You may place the sock and rice in the microwave for 30-60 seconds at a time or until warm.  Be cautious that the sock/rice is not too hot.  -Do stretches to help relax the surrounding muscles.  When doing stretches, be sure to hold your body in that position (avoid moving) and hold the stretch for 30 seconds.     -You are doing great.  -Continue to eat well.  Try to increase your servings of calcium as this can help your bones stay strong and healthy.  Follow a nutrition plan rich in fruits and vegetables and low in fats and cholesterol.    -Be sure to eat 5-7 servings of fruits and vegetables each day.  -Find ways to stay active.  Try to get 150 minutes of moderate activity (where you are breathing faster and slightly sweating) each week.  -Try to maintain a body mass index (BMI) of 18.5-25 as this is considered a healthier weight range.  -Brush your teeth twice daily.  See a dentist every 6-12 months.  -Be sure to use sunblock with SPF 15 or greater when going outside for extended periods of time.  Sunblock should be used even when it is a cloudy day.  Do intermittent skin checks for any concerning skin changes.  Wearing a wide brimmed hat and sunglasses can also be helpful to protect your skin from the sun.  -Monitor for any abnormal skin changes (such as new moles/spots, painful moles, changes in your old moles, wounds that will not heal, multiple colors noted in one lesion, lesions that are asymmetric or not circular, or anything that is concerning for you). If any of these are noted, please schedule an appointment to be seen.     -It is generally recommended for you to complete a health care  directive or living will. These documents will be able to reflect your wishes and desire in the case that you are unable to express them yourself. Please let Dr. Fonseca know if you would like some assistance with this process.      Please seek immediate medical attention (go to the emergency room or urgent care) for the following reasons: worsening symptoms, or any concerning changes.      --------------------------------------------------------------------------------------------------------------------    -We are always looking for ways to improve.  You may be selected to receive a survey regarding your visit today.  We encourage you to complete the survey and provide specific, constructive feedback to help us improve our processes.  Thank you for your time!  -Please review the contact information listed on the after visit summary and in the electronic chart.  Below is the phone number that we have on file.  If there are any changes that are needed to be made, please reach out to the clinic.  [phone] (home)     Patient Education   Ita Bhupinder Xeeb Saib Xyuas Kom Tiv Thaiv Tus Kheej  Nov yog ib co ita bhupinder xeeb uas peb yeej meem muab brandon tib neeg pab lawv noj qab nyob zoo. Luh zaum koj pab pawg saib xyuas yuav muaj ib co ita bhupinder xeeb uas tshwj xeeb brandon koj nkaus xwb. Thov nrog koj pab pawg saib xyuas sib tracy txog luh yam uas koj toob tita kom tiv thaiv koj tus kheej.  Abdullahi Coj Lub Neej  Es xaws xais ntau tammy 150 feeb txhua lub florence tiam (30 feeb txhua hnub, 5 hnub ib lub florence tiam).  Ua yam pab cov leeg muaj zog tuaj 2 zaug txhua lub [place]orence tiam. Luh yam no pab koj tswj hwm koj lub cev qhov hnyav thiab tiv thaiv ntawm kab mob.  Txhob haus luam yeeb.  Pleev tshuaj tiv thaiv tshav kub kom thiaj tsis raug mob khees xaws ntawm nqaij tawv.  Txhua 2 mus brandon 6 xyoos yuav tau kuaj koj lub tsev brandon qhov radon. Radon yog ib rd david uas tsis muaj xim tsis muaj ntxhiab uas mob tau koj cov ntsws. Kom thiaj kawm ntxiv, mus saib  "[email or web address] thiab ntaus ntawv \"Radon in Homes.\"  Ceev cov phom kom tsis muaj mos txwv brandon hauv thiab muab xauv paulette hauv ib qho chaw nyab xeeb xws li lub txhoj, los yog muab xauv paulette thiab muab cov yawm sij zais paulette. Muab cov mos txwv xauv brandon hauv lwm qhov chaw nrug cov phom. Kom thiaj kawm ntxiv, mus saib [email or web address] thiab ntaus ntawv \"safe gun storage.\"  Giovanni Noj Qab Huv  Noj 5 qho txiv hmab txiv ntoo thiab zaub txhua hnub.  Noj nplem nplej, txhuv xim av thiab cov fawm muaj nplej (los theej nplem dawb, txhuv dawb, thiab fawm dawb).  Ua tib zoo noj calcium thiab vitamin D ntau. Saib cov ntawv lo brandon pob khoom noj thiab siv zog noj kom txog 100% RDA (qhov ntau hauv ib hnub).  René meem kuaj ntsuas  Txhua 6 lub hli mus kuaj hniav thiab muab tu.  Txhua xyoo mus saib koj pab pawg saib xyuas giovanni noj qab nyob zoo kom sib tracy txog:  Ib yam dab tsi uas hloov ntawm koj txoj giovanni noj qab nyob zoo.  Cov tshuaj uas koj pab pawg tau hais kom siv.  Giovanni saib xyuas kom tiv thaiv, giovanni npaj brandon tsev neeg, thiab yuav ua li gabriela tiv thaiv ntawm kab mob uas ntev.  Giovanni txhaj tshuaj (koob tshuaj tiv thaiv)   Cov koob tshuaj HPV (txog thaum muaj 27 xyoo), yog koj yeej tsis tau txais tammy li.  Cov koob tshuaj Hepatitis B Kab Mob Siab (txog thaum muaj 60 xyoos), yog koj yeej tsis tau txais tammy li.  Koob tshuaj COVID-19: Txais koob tshuaj no thaum txog caij txais.  Koob tshuaj tiv thaiv ntawm mob khaub thuas: Txais txhua xyoo.  Koob tshuaj tiv thaiv mob daig tsaig: Txais txhua 10 xyoo.  Pneumococcal, hepatitis A, thiab RSV cov koob tshuaj: Nug koj pab pawg saib xyuas chauncey puas tsim nyog brandon koj txais cov no raws li koj qhov pheej hmoo.  Koob tshuaj tiv thaiv ntawm kab mob sawv hlwv (brandon cov muaj 50 xyoo rov giselle).  Giovanni kuaj ntsuas brandon giovanni noj qab nyob zoo  Kuaj mob ntshav qab zib:  Pib thaum muaj 36 xyoos, Mus kuaj mob ntshav qab zib txhua 3 xyoos los yog ntau zog.  Yog koj tseem tsis tau txog 36 xyoos, nug koj pab pawg saib xyuas seb " puas tsim nyog brandon koj kuaj mob ntshav qab zib.  Kuaj cholesterol: Thaum muaj 40 xyoo, pib kuaj cholesterol txhua 5 xyoos, los yog ntau tammy ntawd yog kws meghan mob qhia.  Kuaj txha qhov tuab (DEXA): Thaum txog 51 xyoo lawd, nug koj pab pawg saib xyuas chauncey puas tsim nyog brandon koj kuaj txha chauncey puas ruaj.  Kab Mob Siab Hepatitis C: Kuaj ib zaug hauv koj lub neej.  Kuaj Txoj Hlab Ntshav Hauv Plab: Nrog koj tus kws meghan mob tracy txog giovanni kuaj ntsuas no yog koj:  Tau haus luam yeeb ib zaug li; thiab  Yog txiv neej; thiab  Nruab hnub nyoog 66 thiab 76.  Mob Tita Cees (kis mob dhau ntawm giovanni sib deev)  Ua ntej muaj 25 xyoos: Nug koj pab pawg saib xyuas chauncey puas tsim nyog kuaj brandon mob tita cees.  Kevin qab muaj 25 xyoos: Mus kuaj brandon mob tita cees yog koj muaj giovanni pheej hmoo. Koj muaj giovanni pheej hmoo brandon mob tita cees (suav nrog HIV) yog:  Koj sib deev nrog ntau tammy ib tug neeg.  Koj tsis siv cov hnab looj qau thaum sib deev.  Koj los yog koj tus khub deev kuaj pom tias muaj mob tita cees lawm.  Yog koj muaj giovanni pheej hmoo brandon mob tita cees HIV, nug txog cov tshuaj PrEP kom tiv thaiv ntawm HIV.  Mus kuaj brandon mob tita cees HIV yam ntau tammy ib zaug hauv koj lub neej, txawm yog koj muaj giovanni pheej hmoo brandon mob tita cees HIV los tsis muaj los xij.  Kuaj brandon mob khees xaws  Kuaj lub ncauj tsev me nyuam brandon mob khees xaws: Yog koj muaj ib lub ncauj tsev me nyuam, berenice yuav tau ib sij kuaj lub ncauj tsev me nyuam seb puas muaj mob khees xaws pib thaum muaj 22 xyoos. Neeg feem coob uas ib sij kuaj lub ncauj tsev me nyuam thiab tsis pom dab tsi txawv lawv tsum tau kevin qab muaj 66 xyoos. Nrog koj tus kws meghan mob sib tracy txog qhov no.  Kuaj lub mis brandon mob khees xaws (mammogram): Yog koj tau muaj mis tammy li, yuav tau ib sij kuaj lub mis pib thaum muaj 41 xyoo. Giovanni kuaj no yog kom saib seb puas muaj mob khees xaws hauv lub mis.  Kuaj Txoj Hnyuv Brandon Mob Khees Xaws: Yeej tseem ceeb pib kuaj txoj hnyuv brandon mob khees xaws pib thaum muaj 46 xyoos.  Txhua  11 xyoo yuav tau kuaj txoj hnyuv (los yog ntau zog yog koj muaj giovanni pheej hmoo) Los sis, nug koj tus kws meghan mob txog giovanni kuaj thooj quav FIT txhua xyoo los yog Cologuard txhua 4 xyoos.  Kom thiaj kawm ntxiv txog luh rd kuaj no, mus saib: [email or web address].  Yog xav tau giovanni pab txog qhov no, mus saib: [email or web address].  Kuaj lub karissa kua phev (prostate) brandon mob khees xaws: Yog koj muaj ib lub karissa kua phev (prostate) thiab nruab hnub nyoog 56 mus brandon 70, nug koj tus kws emghan mob chauncey puas tsim nyog brandon koj kuaj lub karissa kua phev.  Kuaj ntsws brandon mob khees xaws: Yog koj haus luam yeeb braswell sim no los yog tau haus yav tas los uas muaj hnub nyoog nruab 50 xyoos mus brandon 80 xyoo, nug koj pab pawg saib xyuas chauncey puas tsim nyog brandon koj yeej meem kuaj lub ntsws brandon mob khees xaws.    Brandon cov rd phiaj giovanni siv ua ntaub ntawv qhia nkaus xwb. Yuav tsis pauv giovanni qhia los ntawm koj qhov chaw meghan mob. Copyright (tuav tatum)   2023 [facility] [employer] [facility].   Txhua txoj tatum raug tswj tseg lawm. Tshaj xyuas los ntawm [facility]. [employer] [id number] - REV 04/24.  Learning About Activities of Daily Living  What are activities of daily living?     Activities of daily living (ADLs) are the basic self-care tasks you do every day. These include eating, bathing, dressing, and moving around.  As you age, and if you have health problems, you may find that it's harder to do some of these tasks. If so, your doctor can suggest ideas that may help.  To measure what kind of help you may need, your doctor will ask how well you are able to do ADLs. Let your doctor know if there are any tasks that you are having trouble doing. This is an important first step to getting help. And when you have the help you need, you can stay as independent as possible.  How will a doctor assess your ADLs?  Asking about ADLs is part of a routine health checkup your doctor will likely do as you age. Your health check might be done in a  doctor's office, in your home, or at a hospital. The goal is to find out if you are having any problems that could make it hard to care for yourself or that make it unsafe for you to be on your own.  To measure your ADLs, your doctor will ask how hard it is for you to do routine tasks. Your doctor may also want to know if you have changed the way you do a task because of a health problem. Your doctor may watch how you:  Walk back and forth.  Keep your balance while you stand or walk.  Move from sitting to standing or from a bed to a chair.  Button or unbutton a shirt or sweater.  Remove and put on your shoes.  It's common to feel a little worried or anxious if you find you can't do all the things you used to be able to do. Talking with your doctor about ADLs is a way to make sure you're as safe as possible and able to care for yourself as well as you can. You may want to bring a caregiver, friend, or family member to your checkup. They can help you talk to your doctor.  Follow-up care is a key part of your treatment and safety. Be sure to make and go to all appointments, and call your doctor if you are having problems. It's also a good idea to know your test results and keep a list of the medicines you take.  Current as of: October 24, 2023  Content Version: 14.2 2024 [facility] [employer].   Care instructions adapted under license by your healthcare professional. If you have questions about a medical condition or this instruction, always ask your healthcare professional. Healthwise, Incorporated disclaims any warranty or liability for your use of this information.    Preventing Falls: Care Instructions  Injuries and health problems such as trouble walking or poor eyesight can increase your risk of falling. So can some medicines. But there are things you can do to help prevent falls. You can exercise to get stronger. You can also arrange your home to make it safer.    Talk to your doctor about the medicines you  "take. Ask if any of them increase the risk of falls and whether they can be changed or stopped.   Try to exercise regularly. It can help improve your strength and balance. This can help lower your risk of falling.         Practice fall safety and prevention.   Wear low-heeled shoes that fit well and give your feet good support. Talk to your doctor if you have foot problems that make this hard.  Carry a cellphone or wear a medical alert device that you can use to call for help.  Use stepladders instead of chairs to reach high objects. Don't climb if you're at risk for falls. Ask for help, if needed.  Wear the correct eyeglasses, if you need them.        Make your home safer.   Remove rugs, cords, clutter, and furniture from walkways.  Keep your house well lit. Use night-lights in hallways and bathrooms.  Install and use sturdy handrails on stairways.  Wear nonskid footwear, even inside. Don't walk barefoot or in socks without shoes.        Be safe outside.   Use handrails, curb cuts, and ramps whenever possible.  Keep your hands free by using a shoulder bag or backpack.  Try to walk in well-lit areas. Watch out for uneven ground, changes in pavement, and debris.  Be careful in the winter. Walk on the grass or gravel when sidewalks are slippery. Use de-icer on steps and walkways. Add non-slip devices to shoes.    Put grab bars and nonskid mats in your shower or tub and near the toilet. Try to use a shower chair or bath bench when bathing.   Get into a tub or shower by putting in your weaker leg first. Get out with your strong side first. Have a phone or medical alert device in the bathroom with you.   Where can you learn more?  Go to https://www.[employer].net/patiented  Enter G117 in the search box to learn more about \"Preventing Falls: Care Instructions.\"  Current as of: July 17, 2023  Content Version: 14.2 2024 [employer].   Care instructions adapted under license by your healthcare professional. If " you have questions about a medical condition or this instruction, always ask your healthcare professional. Healthwise, [facility] disclaims any warranty or liability for your use of this information.    Learning About Stress  What is stress?     Stress is your body's response to a hard situation. Your body can have a physical, emotional, or mental response. Stress is a fact of life for most people, and it affects everyone differently. What causes stress for you may not be stressful for someone else.  A lot of things can cause stress. You may feel stress when you go on a job interview, take a test, or run a race. This kind of short-term stress is normal and even useful. It can help you if you need to work hard or react quickly. For example, stress can help you finish an important job on time.  Long-term stress is caused by ongoing stressful situations or events. Examples of long-term stress include long-term health problems, ongoing problems at work, or conflicts in your family. Long-term stress can harm your health.  How does stress affect your health?  When you are stressed, your body responds as though you are in danger. It makes hormones that speed up your heart, make you breathe faster, and give you a burst of energy. This is called the fight-or-flight stress response. If the stress is over quickly, your body goes back to normal and no harm is done.  But if stress happens too often or lasts too long, it can have bad effects. Long-term stress can make you more likely to get sick, and it can make symptoms of some diseases worse. If you tense up when you are stressed, you may develop neck, shoulder, or low back pain. Stress is linked to high blood pressure and heart disease.  Stress also harms your emotional health. It can make you watson, tense, or depressed. Your relationships may suffer, and you may not do well at work or school.  What can you do to manage stress?  You can try these things to help manage  stress:   Do something active. Exercise or activity can help reduce stress. Walking is a great way to get started. Even everyday activities such as housecleaning or yard work can help.  Try yoga or raghu chi. These techniques combine exercise and meditation. You may need some training at first to learn them.  Do something you enjoy. For example, listen to music or go to a movie. Practice your hobby or do volunteer work.  Meditate. This can help you relax, because you are not worrying about what happened before or what may happen in the future.  Do guided imagery. Imagine yourself in any setting that helps you feel calm. You can use online videos, books, or a teacher to guide you.  Do breathing exercises. For example:  From a standing position, bend forward from the waist with your knees slightly bent. Let your arms dangle close to the floor.  Breathe in slowly and deeply as you return to a standing position. Roll up slowly and lift your head last.  Hold your breath for just a few seconds in the standing position.  Breathe out slowly and bend forward from the waist.  Let your feelings out. Talk, laugh, cry, and express anger when you need to. Talking with supportive friends or family, a counselor, or a ethel leader about your feelings is a healthy way to relieve stress. Avoid discussing your feelings with people who make you feel worse.  Write. It may help to write about things that are bothering you. This helps you find out how much stress you feel and what is causing it. When you know this, you can find better ways to cope.  What can you do to prevent stress?  You might try some of these things to help prevent stress:  Manage your time. This helps you find time to do the things you want and need to do.  Get enough sleep. Your body recovers from the stresses of the day while you are sleeping.  Get support. Your family, friends, and community can make a difference in how you experience stress.  Limit your news feed.  "Avoid or limit time on social media or news that may make you feel stressed.  Do something active. Exercise or activity can help reduce stress. Walking is a great way to get started.  Where can you learn more?  Go to https://www.[employer].net/patiented  Enter N032 in the search box to learn more about \"Learning About Stress.\"  Current as of: October 24, 2023  Content Version: 14.2 2024 [employer].   Care instructions adapted under license by your healthcare professional. If you have questions about a medical condition or this instruction, always ask your healthcare professional. Healthwise, Incorporated disclaims any warranty or liability for your use of this information.    Learning About Depression Screening  What is depression screening?  Depression screening is a way to see if you have depression symptoms. It may be done by a doctor or counselor. It's often part of a routine checkup. That's because your mental health is just as important as your physical health.  Depression is a mental health condition that affects how you feel, think, and act. You may:  Have less energy.  Lose interest in your daily activities.  Feel sad and grouchy for a long time.  Depression is very common. It affects people of all ages.  Many things can lead to depression. Some people become depressed after they have a stroke or find out they have a major illness like cancer or heart disease. The death of a loved one or a breakup may lead to depression. It can run in families. Most experts believe that a combination of inherited genes and stressful life events can cause it.  What happens during screening?  You may be asked to fill out a form about your depression symptoms. You and the doctor will discuss your answers. The doctor may ask you more questions to learn more about how you think, act, and feel.  What happens after screening?  If you have symptoms of depression, your doctor will talk to you about your " "options.  Doctors usually treat depression with medicines or counseling. Often, combining the two works best. Many people don't get help because they think that they'll get over the depression on their own. But people with depression may not get better unless they get treatment.  The cause of depression is not well understood. There may be many factors involved. But if you have depression, it's not your fault.  A serious symptom of depression is thinking about death or suicide. If you or someone you care about talks about this or about feeling hopeless, get help right away.  It's important to know that depression can be treated. Medicine, counseling, and self-care may help.  Where can you learn more?  Go to https://www.[employer].net/patiented  Enter T185 in the search box to learn more about \"Learning About Depression Screening.\"  Current as of: June 24, 2023  Content Version: 14.2 2024 [employer].   Care instructions adapted under license by your healthcare professional. If you have questions about a medical condition or this instruction, always ask your healthcare professional. Healthwise, Incorporated disclaims any warranty or liability for your use of this information.       "

## 2024-11-21 LAB
ALBUMIN SERPL BCG-MCNC: 4.4 G/DL (ref 3.5–5.2)
ALP SERPL-CCNC: 81 U/L (ref 40–150)
ALT SERPL W P-5'-P-CCNC: 23 U/L (ref 0–50)
ANION GAP SERPL CALCULATED.3IONS-SCNC: 10 MMOL/L (ref 7–15)
AST SERPL W P-5'-P-CCNC: 27 U/L (ref 0–45)
BILIRUB DIRECT SERPL-MCNC: <0.2 MG/DL (ref 0–0.3)
BILIRUB SERPL-MCNC: 0.8 MG/DL
BUN SERPL-MCNC: 23.7 MG/DL (ref 8–23)
CALCIUM SERPL-MCNC: 9.8 MG/DL (ref 8.8–10.4)
CHLORIDE SERPL-SCNC: 102 MMOL/L (ref 98–107)
CHOLEST SERPL-MCNC: 299 MG/DL
CREAT SERPL-MCNC: 0.8 MG/DL (ref 0.51–0.95)
EGFRCR SERPLBLD CKD-EPI 2021: 75 ML/MIN/1.73M2
FASTING STATUS PATIENT QL REPORTED: YES
FASTING STATUS PATIENT QL REPORTED: YES
GLUCOSE SERPL-MCNC: 96 MG/DL (ref 70–99)
HCO3 SERPL-SCNC: 27 MMOL/L (ref 22–29)
HDLC SERPL-MCNC: 62 MG/DL
LDLC SERPL CALC-MCNC: 195 MG/DL
NONHDLC SERPL-MCNC: 237 MG/DL
POTASSIUM SERPL-SCNC: 4 MMOL/L (ref 3.4–5.3)
PROT SERPL-MCNC: 7.3 G/DL (ref 6.4–8.3)
SODIUM SERPL-SCNC: 139 MMOL/L (ref 135–145)
TRIGL SERPL-MCNC: 208 MG/DL

## 2024-11-22 RX ORDER — ATORVASTATIN CALCIUM 80 MG/1
80 TABLET, FILM COATED ORAL AT BEDTIME
Qty: 90 TABLET | Refills: 3 | Status: SHIPPED | OUTPATIENT
Start: 2024-11-22

## 2025-02-05 ENCOUNTER — PATIENT OUTREACH (OUTPATIENT)
Dept: GERIATRIC MEDICINE | Facility: CLINIC | Age: 80
End: 2025-02-05
Payer: COMMERCIAL

## 2025-02-05 NOTE — PROGRESS NOTES
[facility] Care Coordination Contact    Called  daughter-in-law Cherry  to schedule annual HRA home visit. HRA has been scheduled for 2/7/25 at 10 AM.    Sidney Fonseca RN, PHN   [facility]  [phone]

## 2025-02-12 ENCOUNTER — PATIENT OUTREACH (OUTPATIENT)
Dept: GERIATRIC MEDICINE | Facility: CLINIC | Age: 80
End: 2025-02-12
Payer: COMMERCIAL

## 2025-02-12 PROBLEM — N39.46 MIXED STRESS AND URGE URINARY INCONTINENCE: Status: ACTIVE | Noted: 2025-02-12

## 2025-02-12 NOTE — PROGRESS NOTES
[facility] Care Coordination Contact    [facility]:  Emailed required CFSS documents to [facility].  Mailed member supplemental summary chart and assessment summary letter.     Ana Paula Dillon  Care Management Specialist  [facility]  [phone]

## 2025-02-13 ENCOUNTER — PATIENT OUTREACH (OUTPATIENT)
Dept: GERIATRIC MEDICINE | Facility: CLINIC | Age: 80
End: 2025-02-13
Payer: COMMERCIAL

## 2025-02-13 NOTE — LETTER
February 13, 2025       PRESLEY DORMAN  [address] [facility] [address]      Dear Presley,    At [facility], we re dedicated to improving your health and wellness. Enclosed is the Support Plan developed with you on 02/07/2025. Please review the Support Plan carefully.    As a reminder, during your visit we talked about:   Ways to manage your physical and mental health   Using health care to maintain and improve your health    Your preventive care needs      Remember to contact your care coordinator if you:   Are hospitalized or plan to be hospitalized    Have a fall     Have a change in your physical or mental health   Need help finding support or services    If you have questions or don t agree with your Support Plan, call me at [phone]. You can also call me if your needs change. TTY users call the [place] [employer] at 036 or 1-483.581.1793 (zoqexv-te-pwfkea relay service).    Sincerely,       Sidney Fonseca RN, PHN  [phone]  [email or web address]                [id number] accepted     (06/2024)                [address], [place] [address]  [phone]  fax [phone]  [facility]

## 2025-02-13 NOTE — PROGRESS NOTES
[facility] Care Coordination Contact    Received after visit chart from care coordinator.  Completed following tasks: Mailed copy of support plan to member, Mailed [place] Choices signature sheet pages 3-4, Mailed Safe Medication Disposal , Submitted referrals/auths for ADC transportation and 1x auth for bed pad, and Updated services in Database.   and Provider Signature - No Support Plan Shared:  Member indicates that they do not want their support plan shared with any [facility] providers.    Ana Paula Dillon  Care Management Specialist  [facility]  [phone]

## 2025-02-25 DIAGNOSIS — Z98.890 S/P KNEE SURGERY: ICD-10-CM

## 2025-02-25 DIAGNOSIS — E78.2 MIXED HYPERLIPIDEMIA: ICD-10-CM

## 2025-02-25 DIAGNOSIS — S82.032P: ICD-10-CM

## 2025-02-25 DIAGNOSIS — I10 HYPERTENSION, UNSPECIFIED TYPE: ICD-10-CM

## 2025-02-25 RX ORDER — ATORVASTATIN CALCIUM 80 MG/1
80 TABLET, FILM COATED ORAL AT BEDTIME
Qty: 90 TABLET | Refills: 3 | Status: CANCELLED | OUTPATIENT
Start: 2025-02-25

## 2025-02-25 RX ORDER — AMLODIPINE BESYLATE 5 MG/1
5 TABLET ORAL DAILY
Qty: 90 TABLET | Refills: 3 | Status: CANCELLED | OUTPATIENT
Start: 2025-02-25

## 2025-02-25 NOTE — TELEPHONE ENCOUNTER
Medication Question or Refill    Contacts       Contact Date/Time Type Contact Phone/Fax    02/25/2025 09:39 AM CST Phone (Incoming) Torri Lowry (Self) [phone] (H)            What medication are you calling about (include dose and sig)?: Multivitamin    Preferred Pharmacy:   [facility] - [place], [place] - [address]  [address]  [address]  [place] [address]  Phone: [phone] Fax: [phone]      Controlled Substance Agreement on file:   CSA -- Patient Level:    CSA: None found at the patient level.       Who prescribed the medication?: Dr. Fonseca/Dr. Gonsalez    Do you need a refill? Yes    When did you use the medication last? Out.     Patient offered an appointment? No    Do you have any questions or concerns?  Need the medication to promote healthy bones.  Medication

## 2025-02-25 NOTE — TELEPHONE ENCOUNTER
Medication Question or Refill        What medication are you calling about (include dose and sig)?: atorvastatin (LIPITOR) 80 MG tablet     amLODIPine (NORVASC) 5 MG tablet       Preferred Pharmacy:   [facility] - [place], [place] - [address] [place], [place] [address]  [address]  [address]  [place] [address]  Phone: [phone] Fax: [phone]      Controlled Substance Agreement on file:   CSA -- Patient Level:    CSA: None found at the patient level.       Who prescribed the medication?: PCP    Do you need a refill? Yes    When did you use the medication last? N/A    Patient offered an appointment? No    Do you have any questions or concerns?  No      Okay to leave a detailed message?: Yes at Home number on file [phone] (home)

## 2025-03-26 ENCOUNTER — PATIENT OUTREACH (OUTPATIENT)
Dept: GERIATRIC MEDICINE | Facility: CLINIC | Age: 80
End: 2025-03-26
Payer: COMMERCIAL

## 2025-03-26 NOTE — PROGRESS NOTES
[facility] Care Coordination Contact    Received call from Leighann, daughter-in-law, [facility] is missing auth for homemaking services.    Called home care agency and spoke with [facility]. Shared information was sent to [facility] in Feb and for provider to check [facility] provider portal for SA. If [facility] does not have auth then for [facility] to reach out to CMS. Phone number provided.    Sidney Fonseca RN, PHN   [facility]  [phone]

## 2025-06-11 ENCOUNTER — PATIENT OUTREACH (OUTPATIENT)
Dept: GERIATRIC MEDICINE | Facility: CLINIC | Age: 80
End: 2025-06-11
Payer: COMMERCIAL

## 2025-06-11 NOTE — PROGRESS NOTES
[facility] Care Coordination Contact    Received notification from manager Radha and voice mail from daughter-in-law Cherry that member moved out of state.    Called daughter-in-law, she confirmed member moved to [place] on 6/1/2025.     Member no longer active with [facility] community case management effective 6/1/2025.  Reason for community disenrollment: Other:  moved out of state    CFSS consultation pending, provider notified of the above via email.     Sidney Fonseca RN, PHN   [facility]  [phone]

## (undated) DEVICE — SOL WATER IRRIG 1000ML BOTTLE 2F7114

## (undated) DEVICE — SU ETHILON 3-0 PSL 30" 1691H

## (undated) DEVICE — SUTURE MONOCRYL+ 3-0 27 UND MCP523H

## (undated) DEVICE — DRAPE C-ARM 60X42" 1013

## (undated) DEVICE — SUTURE VICRYL+ 0 27IN CT-1 UND VCP260H

## (undated) DEVICE — GLOVE BIOGEL PI INDICATOR 8.0 LF 41680

## (undated) DEVICE — SYR 10ML LL W/O NDL 302995

## (undated) DEVICE — NEEDLE HYPO MAGELLAN SAFETY 22GA 1 1/2IN 8881850215

## (undated) DEVICE — DRSG ADAPTIC 3X8" 6113

## (undated) DEVICE — CUFF TOURN 24IN STRL DISP

## (undated) DEVICE — RETRIVER SUTURE LASSO HOFFEE BLUE 710000

## (undated) DEVICE — SUTURE VICRYL+ 1-0 36IN CT-1 UND VCP947H

## (undated) DEVICE — GLOVE BIOGEL PI ORTHOPRO SZ 7.5 47675

## (undated) DEVICE — GUIDWIRE NON-THREADED 1.4X150MM

## (undated) DEVICE — ELECTRODE PATIENT RETURN ADULT L10 FT 2 PLATE CORD 0855C

## (undated) DEVICE — HOLDER LIMB VELCRO OR 0814-1533

## (undated) DEVICE — Device

## (undated) DEVICE — DRILL BIT TWIST STRK CAN 2.7MM W/AO COUPLING 702449

## (undated) DEVICE — SUTURE VICRYL+ 2-0 27IN CT-1 UND VCP259H

## (undated) DEVICE — CUSTOM PACK TOTAL KNEE SOP5BTKHEC

## (undated) DEVICE — SOL NACL 0.9% IRRIG 1000ML BOTTLE 2F7124

## (undated) DEVICE — DRAPE C-ARMOR 5 SIDED 5523

## (undated) RX ORDER — DEXAMETHASONE SODIUM PHOSPHATE 10 MG/ML
INJECTION, EMULSION INTRAMUSCULAR; INTRAVENOUS
Status: DISPENSED
Start: 2024-02-09

## (undated) RX ORDER — CEFAZOLIN SODIUM 1 G/3ML
INJECTION, POWDER, FOR SOLUTION INTRAMUSCULAR; INTRAVENOUS
Status: DISPENSED
Start: 2024-02-09

## (undated) RX ORDER — ONDANSETRON 2 MG/ML
INJECTION INTRAMUSCULAR; INTRAVENOUS
Status: DISPENSED
Start: 2024-02-09

## (undated) RX ORDER — PROPOFOL 10 MG/ML
INJECTION, EMULSION INTRAVENOUS
Status: DISPENSED
Start: 2024-02-09

## (undated) RX ORDER — FENTANYL CITRATE 50 UG/ML
INJECTION, SOLUTION INTRAMUSCULAR; INTRAVENOUS
Status: DISPENSED
Start: 2024-02-09

## (undated) RX ORDER — LIDOCAINE HYDROCHLORIDE 10 MG/ML
INJECTION, SOLUTION EPIDURAL; INFILTRATION; INTRACAUDAL; PERINEURAL
Status: DISPENSED
Start: 2024-02-09